# Patient Record
Sex: FEMALE | Race: WHITE | NOT HISPANIC OR LATINO
[De-identification: names, ages, dates, MRNs, and addresses within clinical notes are randomized per-mention and may not be internally consistent; named-entity substitution may affect disease eponyms.]

---

## 2017-01-31 ENCOUNTER — RX RENEWAL (OUTPATIENT)
Age: 72
End: 2017-01-31

## 2017-02-15 ENCOUNTER — RESULT REVIEW (OUTPATIENT)
Age: 72
End: 2017-02-15

## 2017-03-07 ENCOUNTER — RX RENEWAL (OUTPATIENT)
Age: 72
End: 2017-03-07

## 2017-04-05 ENCOUNTER — APPOINTMENT (OUTPATIENT)
Dept: HEMATOLOGY ONCOLOGY | Facility: CLINIC | Age: 72
End: 2017-04-05

## 2017-04-05 VITALS
BODY MASS INDEX: 18.25 KG/M2 | SYSTOLIC BLOOD PRESSURE: 110 MMHG | DIASTOLIC BLOOD PRESSURE: 70 MMHG | TEMPERATURE: 98 F | HEIGHT: 67 IN | WEIGHT: 116.25 LBS | RESPIRATION RATE: 14 BRPM | HEART RATE: 108 BPM | OXYGEN SATURATION: 98 %

## 2017-04-06 ENCOUNTER — RESULT REVIEW (OUTPATIENT)
Age: 72
End: 2017-04-06

## 2017-04-06 LAB
BASOPHILS # BLD AUTO: 0.03 K/UL
BASOPHILS NFR BLD AUTO: 0.4 %
EOSINOPHIL # BLD AUTO: 0.07 K/UL
EOSINOPHIL NFR BLD AUTO: 1 %
HCT VFR BLD CALC: 41.8 %
HGB BLD-MCNC: 12.9 G/DL
IMM GRANULOCYTES NFR BLD AUTO: 0.1 %
LYMPHOCYTES # BLD AUTO: 0.85 K/UL
LYMPHOCYTES NFR BLD AUTO: 12.5 %
MAN DIFF?: NORMAL
MCHC RBC-ENTMCNC: 27.6 PG
MCHC RBC-ENTMCNC: 30.9 GM/DL
MCV RBC AUTO: 89.3 FL
MONOCYTES # BLD AUTO: 0.32 K/UL
MONOCYTES NFR BLD AUTO: 4.7 %
NEUTROPHILS # BLD AUTO: 5.53 K/UL
NEUTROPHILS NFR BLD AUTO: 81.3 %
PLATELET # BLD AUTO: 151 K/UL
RBC # BLD: 4.68 M/UL
RBC # FLD: 14.1 %
WBC # FLD AUTO: 6.81 K/UL

## 2017-07-12 ENCOUNTER — APPOINTMENT (OUTPATIENT)
Dept: HEMATOLOGY ONCOLOGY | Facility: CLINIC | Age: 72
End: 2017-07-12

## 2017-07-12 VITALS
SYSTOLIC BLOOD PRESSURE: 120 MMHG | HEART RATE: 86 BPM | DIASTOLIC BLOOD PRESSURE: 70 MMHG | TEMPERATURE: 98.2 F | RESPIRATION RATE: 14 BRPM | WEIGHT: 114 LBS | OXYGEN SATURATION: 98 % | HEIGHT: 67 IN | BODY MASS INDEX: 17.89 KG/M2

## 2017-07-13 ENCOUNTER — LABORATORY RESULT (OUTPATIENT)
Age: 72
End: 2017-07-13

## 2017-07-13 ENCOUNTER — RESULT REVIEW (OUTPATIENT)
Age: 72
End: 2017-07-13

## 2017-07-13 LAB
BASOPHILS NFR BLD AUTO: 0.6 %
EOSINOPHIL NFR BLD AUTO: 1.1 %
HCT VFR BLD CALC: 41.4 %
HGB BLD-MCNC: 13.5 G/DL
LDH SERPL-CCNC: 257 U/L
LYMPHOCYTES NFR BLD AUTO: 14.7 %
MAN DIFF?: NO
MCHC RBC-ENTMCNC: 28 PG
MCHC RBC-ENTMCNC: 32.6 G/DL
MCV RBC AUTO: 85.9 FL
MONOCYTES NFR BLD AUTO: 6.8 %
NEUTROPHILS NFR BLD AUTO: 76.8 %
PLATELET # BLD AUTO: 92 K/UL
RBC # BLD: 4.82 M/UL
RBC # FLD: 14 %
WBC # FLD AUTO: 5.43 K/UL

## 2017-07-19 ENCOUNTER — LABORATORY RESULT (OUTPATIENT)
Age: 72
End: 2017-07-19

## 2017-07-19 ENCOUNTER — APPOINTMENT (OUTPATIENT)
Dept: HEMATOLOGY ONCOLOGY | Facility: CLINIC | Age: 72
End: 2017-07-19

## 2017-08-02 ENCOUNTER — LABORATORY RESULT (OUTPATIENT)
Age: 72
End: 2017-08-02

## 2017-08-02 ENCOUNTER — APPOINTMENT (OUTPATIENT)
Dept: HEMATOLOGY ONCOLOGY | Facility: CLINIC | Age: 72
End: 2017-08-02
Payer: MEDICARE

## 2017-08-02 PROCEDURE — 36415 COLL VENOUS BLD VENIPUNCTURE: CPT

## 2017-08-03 ENCOUNTER — LABORATORY RESULT (OUTPATIENT)
Age: 72
End: 2017-08-03

## 2017-08-31 ENCOUNTER — RESULT REVIEW (OUTPATIENT)
Age: 72
End: 2017-08-31

## 2017-09-06 ENCOUNTER — LABORATORY RESULT (OUTPATIENT)
Age: 72
End: 2017-09-06

## 2017-09-06 ENCOUNTER — APPOINTMENT (OUTPATIENT)
Dept: HEMATOLOGY ONCOLOGY | Facility: CLINIC | Age: 72
End: 2017-09-06
Payer: MEDICARE

## 2017-09-06 PROCEDURE — 36415 COLL VENOUS BLD VENIPUNCTURE: CPT

## 2017-09-07 ENCOUNTER — LABORATORY RESULT (OUTPATIENT)
Age: 72
End: 2017-09-07

## 2017-11-28 ENCOUNTER — APPOINTMENT (OUTPATIENT)
Dept: HEMATOLOGY ONCOLOGY | Facility: CLINIC | Age: 72
End: 2017-11-28
Payer: MEDICARE

## 2017-11-28 ENCOUNTER — INPATIENT (INPATIENT)
Facility: HOSPITAL | Age: 72
LOS: 1 days | Discharge: HOME | DRG: 813 | End: 2017-11-30
Attending: INTERNAL MEDICINE | Admitting: INTERNAL MEDICINE
Payer: MEDICARE

## 2017-11-28 VITALS
DIASTOLIC BLOOD PRESSURE: 76 MMHG | WEIGHT: 114 LBS | OXYGEN SATURATION: 98 % | SYSTOLIC BLOOD PRESSURE: 133 MMHG | HEART RATE: 86 BPM | TEMPERATURE: 97.5 F | RESPIRATION RATE: 14 BRPM | HEIGHT: 67 IN | BODY MASS INDEX: 17.89 KG/M2

## 2017-11-28 VITALS
DIASTOLIC BLOOD PRESSURE: 84 MMHG | RESPIRATION RATE: 18 BRPM | HEART RATE: 98 BPM | SYSTOLIC BLOOD PRESSURE: 128 MMHG | TEMPERATURE: 98 F

## 2017-11-28 DIAGNOSIS — Z90.710 ACQUIRED ABSENCE OF BOTH CERVIX AND UTERUS: Chronic | ICD-10-CM

## 2017-11-28 DIAGNOSIS — R63.8 OTHER SYMPTOMS AND SIGNS CONCERNING FOOD AND FLUID INTAKE: ICD-10-CM

## 2017-11-28 DIAGNOSIS — Z29.9 ENCOUNTER FOR PROPHYLACTIC MEASURES, UNSPECIFIED: ICD-10-CM

## 2017-11-28 DIAGNOSIS — C55 MALIGNANT NEOPLASM OF UTERUS, PART UNSPECIFIED: ICD-10-CM

## 2017-11-28 DIAGNOSIS — D69.3 IMMUNE THROMBOCYTOPENIC PURPURA: ICD-10-CM

## 2017-11-28 LAB
ANION GAP SERPL CALC-SCNC: 14 MMOL/L — SIGNIFICANT CHANGE UP (ref 5–17)
APTT BLD: 38.7 SEC — HIGH (ref 27.5–37.4)
BASOPHILS NFR BLD AUTO: 0.3 %
BASOPHILS NFR BLD AUTO: 0.4 % — SIGNIFICANT CHANGE UP (ref 0–2)
BLD GP AB SCN SERPL QL: NEGATIVE — SIGNIFICANT CHANGE UP
BUN SERPL-MCNC: 16 MG/DL — SIGNIFICANT CHANGE UP (ref 7–23)
CALCIUM SERPL-MCNC: 9.5 MG/DL — SIGNIFICANT CHANGE UP (ref 8.4–10.5)
CHLORIDE SERPL-SCNC: 103 MMOL/L — SIGNIFICANT CHANGE UP (ref 96–108)
CO2 SERPL-SCNC: 24 MMOL/L — SIGNIFICANT CHANGE UP (ref 22–31)
CREAT SERPL-MCNC: 0.71 MG/DL — SIGNIFICANT CHANGE UP (ref 0.5–1.3)
EOSINOPHIL NFR BLD AUTO: 0.9 %
EOSINOPHIL NFR BLD AUTO: 0.9 % — SIGNIFICANT CHANGE UP (ref 0–6)
GLUCOSE SERPL-MCNC: 106 MG/DL — HIGH (ref 70–99)
HCT VFR BLD CALC: 38.2 %
HCT VFR BLD CALC: 38.8 % — SIGNIFICANT CHANGE UP (ref 34.5–45)
HGB BLD-MCNC: 12.8 G/DL
HGB BLD-MCNC: 13 G/DL — SIGNIFICANT CHANGE UP (ref 11.5–15.5)
INR BLD: 1.01 — SIGNIFICANT CHANGE UP (ref 0.88–1.16)
LYMPHOCYTES # BLD AUTO: 10.8 % — LOW (ref 13–44)
LYMPHOCYTES NFR BLD AUTO: 13.2 %
MAN DIFF?: NO
MCHC RBC-ENTMCNC: 28.9 PG — SIGNIFICANT CHANGE UP (ref 27–34)
MCHC RBC-ENTMCNC: 29.2 PG
MCHC RBC-ENTMCNC: 33.5 G/DL
MCHC RBC-ENTMCNC: 33.5 G/DL — SIGNIFICANT CHANGE UP (ref 32–36)
MCV RBC AUTO: 86.2 FL — SIGNIFICANT CHANGE UP (ref 80–100)
MCV RBC AUTO: 87.2 FL
MONOCYTES NFR BLD AUTO: 5.6 %
MONOCYTES NFR BLD AUTO: 6.8 % — SIGNIFICANT CHANGE UP (ref 2–14)
NEUTROPHILS NFR BLD AUTO: 80 %
NEUTROPHILS NFR BLD AUTO: 81.1 % — HIGH (ref 43–77)
PLATELET # BLD AUTO: 2 K/UL
PLATELET # BLD AUTO: 2 K/UL — CRITICAL LOW (ref 150–400)
POTASSIUM SERPL-MCNC: 4 MMOL/L — SIGNIFICANT CHANGE UP (ref 3.5–5.3)
POTASSIUM SERPL-SCNC: 4 MMOL/L — SIGNIFICANT CHANGE UP (ref 3.5–5.3)
PROTHROM AB SERPL-ACNC: 11.2 SEC — SIGNIFICANT CHANGE UP (ref 9.8–12.7)
RBC # BLD: 4.38 M/UL
RBC # BLD: 4.5 M/UL — SIGNIFICANT CHANGE UP (ref 3.8–5.2)
RBC # FLD: 13.4 %
RBC # FLD: 13.4 % — SIGNIFICANT CHANGE UP (ref 10.3–16.9)
RH IG SCN BLD-IMP: POSITIVE — SIGNIFICANT CHANGE UP
RH IG SCN BLD-IMP: POSITIVE — SIGNIFICANT CHANGE UP
SODIUM SERPL-SCNC: 141 MMOL/L — SIGNIFICANT CHANGE UP (ref 135–145)
WBC # BLD: 7 K/UL — SIGNIFICANT CHANGE UP (ref 3.8–10.5)
WBC # FLD AUTO: 5.85 K/UL
WBC # FLD AUTO: 7 K/UL — SIGNIFICANT CHANGE UP (ref 3.8–10.5)

## 2017-11-28 PROCEDURE — 36415 COLL VENOUS BLD VENIPUNCTURE: CPT

## 2017-11-28 PROCEDURE — 99285 EMERGENCY DEPT VISIT HI MDM: CPT

## 2017-11-28 PROCEDURE — 99214 OFFICE O/P EST MOD 30 MIN: CPT | Mod: 25

## 2017-11-28 RX ORDER — DEXAMETHASONE 0.5 MG/5ML
40 ELIXIR ORAL ONCE
Qty: 0 | Refills: 0 | Status: COMPLETED | OUTPATIENT
Start: 2017-11-28 | End: 2017-11-28

## 2017-11-28 RX ORDER — IMMUNE GLOBULIN,GAMMA(IGG) 5 %
50 VIAL (ML) INTRAVENOUS ONCE
Qty: 0 | Refills: 0 | Status: COMPLETED | OUTPATIENT
Start: 2017-11-28 | End: 2017-11-28

## 2017-11-28 RX ADMIN — Medication 62.5 GRAM(S): at 22:05

## 2017-11-28 RX ADMIN — Medication 120 MILLIGRAM(S): at 21:07

## 2017-11-28 NOTE — H&P ADULT - HISTORY OF PRESENT ILLNESS
Patient is a 73 yo F w/ PMHx of ITP, previously on oral steroids and rituximab, follows with Dr. Caldwell, presenting today from her outpatient provider's office for a low platelet count. As per the patient, she regularly follows up with Dr. Caldwell for her ITP and gets routine CBCs regularly to ensure platelet levels remain stable. She had an appointment with Dr. Caldwell today, CBC showed a platelet count of 2, after which she was sent here to [facility] for further evaluation. The patient noticed some bruising on her skin along with bleeding gums and rectal spotting for the past week.   ED vitals: afebrile, HR-98, B.P-128/84, RR-18, o2 saturation 98% on RA. She was given dexamethasone and IVIG and admitted to [facility] for further management. Patient is a 73 yo F w/ PMHx of ITP diagnosed in 2014, s/p hystrectomy previously on oral steroids and rituximab, follows with Dr. Caldwell, presenting today from her outpatient provider's office for a low platelet count. As per the patient, she regularly follows up with Dr. Caldwell for her ITP and gets routine CBCs regularly to ensure platelet levels remain stable. She had an appointment with Dr. Caldwell today, CBC showed a platelet count of 2, after which she was sent here to [facility] for further evaluation. The patient noticed some bruising on her skin along with bleeding gums and rectal spotting for the past week.   ED vitals: afebrile, HR-98, B.P-128/84, RR-18, o2 saturation 98% on RA. She was given dexamethasone and IVIG and admitted to [facility] for further management. Patient is a 73 yo F w/ PMHx of ITP, uterine cancer s/p hysterectomy presenting today from her outpatient provider's office for a low platelet count. As per the patient, she was diagnosed with ITP in 2014, she follows with Dr. Caldwell regularly for treatment, she has received steroids and rituximab previously and currently was on no treatment. She noticed some bruising in her arms, chest, belly and legs which started almost 1 week ago associated with bleeding gums and mild rectal spotting which started 3 days ago. She went to see Dr. Caldwell today, who ordered a CBC which showed a platelet count of 2, for which she was told to come to [facility] for further treatment.   Of note, after reviewing [employer], patient had a CBC done in Sept which showed a platelet count of 98, CBC done in August showed a platelet count of 107.   ED vitals: afebrile, HR-98, B.P-128/84, RR-18, o2 saturation 98% on RA. She was given dexamethasone and IVIG and admitted to [facility] for further management.

## 2017-11-28 NOTE — H&P ADULT - ASSESSMENT
71 yo F w/ PMHx of ITP coming to [facility] from her outpatient provider's office for a low platelet count of 2, being admitted to [facility] for steroid and IVIG treatment.

## 2017-11-28 NOTE — H&P ADULT - NSHPSOCIALHISTORY_GEN_ALL_CORE
Tobacco use: Past smoker, quit almost 40 years ago.   Alcohol use: Denies excessive alcohol use, 1 drink on social occasions.  Denies drug abuse.

## 2017-11-28 NOTE — H&P ADULT - NSHPPHYSICALEXAM_GEN_ALL_CORE
.  VITAL SIGNS:  T(C): 36.4 (11-28-17 @ 22:12), Max: 36.7 (11-28-17 @ 19:32)  T(F): 97.6 (11-28-17 @ 22:12), Max: 98.1 (11-28-17 @ 19:32)  HR: 80 (11-28-17 @ 22:12) (80 - 98)  BP: 155/80 (11-28-17 @ 22:12) (128/84 - 155/80)  BP(mean): --  RR: 18 (11-28-17 @ 22:12) (18 - 18)  SpO2: 98% (11-28-17 @ 22:12) (98% - 98%)  Wt(kg): --    PHYSICAL EXAM:    Constitutional: WDWN resting comfortably in bed; NAD  Head: NC/AT  Eyes: PERRL, EOMI, anicteric sclera  ENT: no nasal discharge; uvula midline, no oropharyngeal erythema or exudates; MMM  Neck: supple; no JVD or thyromegaly  Respiratory: CTA B/L; no W/R/R, no retractions  Cardiac: +S1/S2; RRR; no M/R/G; PMI non-displaced  Gastrointestinal: abdomen soft, NT/ND; no rebound or guarding; +BSx4  Back: spine midline, no bony tenderness or step-offs; no CVAT B/L  Extremities: WWP, no clubbing or cyanosis; no peripheral edema  Musculoskeletal: NROM x4; no joint swelling, tenderness or erythema  Vascular: 2+ radial, femoral, DP/PT pulses B/L  Dermatologic: skin warm, dry and intact; no rashes, wounds, or scars  Lymphatic: no submandibular or cervical LAD  Neurologic: AAOx3; CNII-XII grossly intact; no focal deficits  Psychiatric: affect and characteristics of appearance, verbalizations, behaviors are appropriate VITAL SIGNS:  T(C): 36.4 (11-28-17 @ 22:12), Max: 36.7 (11-28-17 @ 19:32)  T(F): 97.6 (11-28-17 @ 22:12), Max: 98.1 (11-28-17 @ 19:32)  HR: 80 (11-28-17 @ 22:12) (80 - 98)  BP: 155/80 (11-28-17 @ 22:12) (128/84 - 155/80)  BP(mean): --  RR: 18 (11-28-17 @ 22:12) (18 - 18)  SpO2: 98% (11-28-17 @ 22:12) (98% - 98%)  Wt(kg): --    PHYSICAL EXAM:    Constitutional: WDWN resting comfortably in bed; NAD  Head: NC/AT  Eyes: PERRL, EOMI, anicteric sclera  ENT: no nasal discharge; uvula midline, no oropharyngeal erythema or exudates; MMM  Neck: supple; no JVD or thyromegaly  Respiratory: CTA B/L; no W/R/R, no retractions  Cardiac: +S1/S2; RRR; no M/R/G; PMI non-displaced  Gastrointestinal: abdomen soft, NT/ND; no rebound or guarding; +BSx4  Back: spine midline, no bony tenderness or step-offs; no CVAT B/L  Extremities: WWP, no clubbing or cyanosis; no peripheral edema  Musculoskeletal: NROM x4; no joint swelling, tenderness or erythema  Vascular: 2+ radial, femoral, DP/PT pulses B/L  Dermatologic: skin warm, dry and intact; no rashes, wounds, or scars  Lymphatic: no submandibular or cervical LAD  Neurologic: AAOx3; CNII-XII grossly intact; no focal deficits  Psychiatric: affect and characteristics of appearance, verbalizations, behaviors are appropriate VITAL SIGNS:  T(C): 36.4 (11-28-17 @ 22:12), Max: 36.7 (11-28-17 @ 19:32)  T(F): 97.6 (11-28-17 @ 22:12), Max: 98.1 (11-28-17 @ 19:32)  HR: 80 (11-28-17 @ 22:12) (80 - 98)  BP: 155/80 (11-28-17 @ 22:12) (128/84 - 155/80)  BP(mean): --  RR: 18 (11-28-17 @ 22:12) (18 - 18)  SpO2: 98% (11-28-17 @ 22:12) (98% - 98%)  Wt(kg): --    PHYSICAL EXAM:  Physical Exam:   Constitutional: NAD, well-groomed, well-developed  HEENT: PERRLA, EOMI, no drainage or redness  Neck: ,  No JVD  Back: Normal spine flexure, No CVA tenderness, No deformity or limitation of movement  Respiratory: Breath Sounds equal & clear to auscultation, no accessory muscle use  Cardiovascular: Regular rate & rhythm, normal S1, S2; no murmurs, gallops or rubs; no S3, S4  Gastrointestinal: Soft, non-tender, non distended, normal bowel sounds  Extremities: No peripheral edema, No cyanosis, clubbing   Vascular: Equal and normal pulses: 2+ peripheral pulses throughout  Neurological:    A&O x 3; no sensory, motor  deficits, normal reflexes  Psychiatric: Normal mood, normal affect  Skin: red petechiae over legs, chest, arms

## 2017-11-28 NOTE — H&P ADULT - PROBLEM SELECTOR PLAN 1
-History of ITP, follows with Dr. Caldwell for a platelet count of 2 on outpatient labs, most recent CBC in Sept showed a platelet count of 98. Patient previously has received 2 rounds of steroid treatment along with rituximab infusion, currently on no treatment.   -Admit to [facility] for further management.  -s/p -History of ITP, follows with Dr. Caldwell, sent it by her outpatient provider's office for a platelet count of 2 on outpatient labs, most recent CBC in Sept showed a platelet count of 98. Patient previously has received 2 rounds of steroid treatment along with rituximab infusion, currently on no treatment.   -Admit to [facility] for further management.  -Will give patient decadron and IVIG infusion  -Frequent neuro checks q1 as increase risk in intracranial hemorrhage -History of ITP, follows with Dr. Caldwell, sent it by her outpatient provider's office for a platelet count of 2 on outpatient labs, most recent CBC in Sept showed a platelet count of 98. Patient previously has received 2 rounds of steroid treatment along with rituximab infusion, currently on no treatment.   -Admit to [facility] for further management.  -Will give patient decadron and IVIG infusion  -Frequent neuro checks q1 as increase risk in intracranial hemorrhage  -Please follow heme/onc recs in the AM regarding further treatment. Patient follows with Dr. Caldwell

## 2017-11-28 NOTE — ED PROVIDER NOTE - OBJECTIVE STATEMENT
history of ITP here with low platelets.  Had f/u with [facility] doctor who did labs showing platelet count of 2.  Notes some bruising on arms, gingival bleeding.  Denies headache/chest pain/ sob/ fever/chills/ preceding illness.

## 2017-11-28 NOTE — H&P ADULT - PROBLEM SELECTOR PLAN 4
Dispo: [facility]  Code Status: ppx: IMPROVE score of 1 for age, no HSQ indicated, also will hold in the setting of thrombocytopenia. Patient ambulating, low risk of DVT.     Dispo: [facility]

## 2017-11-28 NOTE — ED CLERICAL - NS ED CLERK NOTE PRE-ARRIVAL INFORMATION; ADDITIONAL PRE-ARRIVAL INFORMATION
72/F/ YUSUF QUINTERO SENT IN BY DR. CAMILO ITP, [facility] 2 WKS TODAY PREDNISRE 40 MG IVIG TO BE ADM UNDER DR. CAMILO ( DR. STOKES)

## 2017-11-28 NOTE — ED PROVIDER NOTE - MEDICAL DECISION MAKING DETAILS
here with low platelets.  suspect recurrent itp.  discussed with Dr. Caldwell who requested 40mg of decadron IV, and ivig 1g/kg.  recommended against transfusion of platelets even though level < 10k due to underlying cause.  admit for further management

## 2017-11-28 NOTE — ED ADULT NURSE NOTE - OBJECTIVE STATEMENT
pt sent in by PMD for low platelet count. pt has hx of platelet disorder. c/o increased ecchymosis to body. denies any CP, SOB , abdominal pain. aaox3, resp easy & unlabored.

## 2017-11-28 NOTE — H&P ADULT - PROBLEM SELECTOR PLAN 3
Diet: Regular  Replete lytes PRN Diet: Regular  Patient tolerating PO intake, no need for fluids at this time  Replete lytes PRN

## 2017-11-28 NOTE — ED PROVIDER NOTE - PROGRESS NOTE DETAILS
attempted admission to [facility] but medicine team concerned about possible spontaneous bleed given platelets < 10k.  requested icu consult.  icu consulted, will see patient

## 2017-11-28 NOTE — H&P ADULT - NSHPLABSRESULTS_GEN_ALL_CORE
.  LABS:                         13.0   7.0   )-----------( 2        ( 28 Nov 2017 20:02 )             38.8     11-28    141  |  103  |  16  ----------------------------<  106<H>  4.0   |  24  |  0.71    Ca    9.5      28 Nov 2017 20:02      PT/INR - ( 28 Nov 2017 20:02 )   PT: 11.2 sec;   INR: 1.01          PTT - ( 28 Nov 2017 20:02 )  PTT:38.7 sec              RADIOLOGY, EKG & ADDITIONAL TESTS: Reviewed. LABS:                         13.0   7.0   )-----------( 2        ( 28 Nov 2017 20:02 )             38.8     11-28    141  |  103  |  16  ----------------------------<  106<H>  4.0   |  24  |  0.71    Ca    9.5      28 Nov 2017 20:02      PT/INR - ( 28 Nov 2017 20:02 )   PT: 11.2 sec;   INR: 1.01          PTT - ( 28 Nov 2017 20:02 )  PTT:38.7 sec              RADIOLOGY, EKG & ADDITIONAL TESTS: Reviewed.

## 2017-11-29 LAB
ANION GAP SERPL CALC-SCNC: 14 MMOL/L — SIGNIFICANT CHANGE UP (ref 5–17)
BUN SERPL-MCNC: 14 MG/DL — SIGNIFICANT CHANGE UP (ref 7–23)
CALCIUM SERPL-MCNC: 9.1 MG/DL — SIGNIFICANT CHANGE UP (ref 8.4–10.5)
CHLORIDE SERPL-SCNC: 103 MMOL/L — SIGNIFICANT CHANGE UP (ref 96–108)
CO2 SERPL-SCNC: 23 MMOL/L — SIGNIFICANT CHANGE UP (ref 22–31)
CREAT SERPL-MCNC: 0.62 MG/DL — SIGNIFICANT CHANGE UP (ref 0.5–1.3)
ERYTHROCYTE [SEDIMENTATION RATE] IN BLOOD BY WESTERGREN METHOD: 13 MM/HR
FERRITIN SERPL-MCNC: 81 NG/ML
GLUCOSE SERPL-MCNC: 171 MG/DL — HIGH (ref 70–99)
HAPTOGLOB SERPL-MCNC: 106 MG/DL
HCT VFR BLD CALC: 37.2 % — SIGNIFICANT CHANGE UP (ref 34.5–45)
HGB BLD-MCNC: 12.5 G/DL — SIGNIFICANT CHANGE UP (ref 11.5–15.5)
IRON SATN MFR SERPL: 19 %
IRON SERPL-MCNC: 51 UG/DL
LDH SERPL-CCNC: 268 U/L
MAGNESIUM SERPL-MCNC: 2.1 MG/DL — SIGNIFICANT CHANGE UP (ref 1.6–2.6)
MCHC RBC-ENTMCNC: 29 PG — SIGNIFICANT CHANGE UP (ref 27–34)
MCHC RBC-ENTMCNC: 33.6 G/DL — SIGNIFICANT CHANGE UP (ref 32–36)
MCV RBC AUTO: 86.3 FL — SIGNIFICANT CHANGE UP (ref 80–100)
PLATELET # BLD AUTO: 1 K/UL — CRITICAL LOW (ref 150–400)
POTASSIUM SERPL-MCNC: 3.9 MMOL/L — SIGNIFICANT CHANGE UP (ref 3.5–5.3)
POTASSIUM SERPL-SCNC: 3.9 MMOL/L — SIGNIFICANT CHANGE UP (ref 3.5–5.3)
RBC # BLD: 4.31 M/UL — SIGNIFICANT CHANGE UP (ref 3.8–5.2)
RBC # FLD: 13.3 % — SIGNIFICANT CHANGE UP (ref 10.3–16.9)
SODIUM SERPL-SCNC: 140 MMOL/L — SIGNIFICANT CHANGE UP (ref 135–145)
TIBC SERPL-MCNC: 263 UG/DL
UIBC SERPL-MCNC: 212 UG/DL
VIT B12 SERPL-MCNC: 1579 PG/ML
WBC # BLD: 4.7 K/UL — SIGNIFICANT CHANGE UP (ref 3.8–10.5)
WBC # FLD AUTO: 4.7 K/UL — SIGNIFICANT CHANGE UP (ref 3.8–10.5)

## 2017-11-29 PROCEDURE — 99222 1ST HOSP IP/OBS MODERATE 55: CPT

## 2017-11-29 PROCEDURE — 99231 SBSQ HOSP IP/OBS SF/LOW 25: CPT | Mod: GC

## 2017-11-29 RX ORDER — IMMUNE GLOBULIN,GAMMA(IGG) 5 %
50 VIAL (ML) INTRAVENOUS DAILY
Qty: 0 | Refills: 0 | Status: DISCONTINUED | OUTPATIENT
Start: 2017-11-29 | End: 2017-11-30

## 2017-11-29 RX ORDER — DEXAMETHASONE 0.5 MG/5ML
40 ELIXIR ORAL DAILY
Qty: 0 | Refills: 0 | Status: DISCONTINUED | OUTPATIENT
Start: 2017-11-29 | End: 2017-11-30

## 2017-11-29 RX ADMIN — Medication 120 MILLIGRAM(S): at 22:14

## 2017-11-29 RX ADMIN — Medication 62.5 GRAM(S): at 23:55

## 2017-11-29 NOTE — CONSULT NOTE ADULT - SUBJECTIVE AND OBJECTIVE BOX
HPI:  Patient is a 71 yo F w/ PMHx of ITP, uterine cancer s/p hysterectomy presenting today from her outpatient provider's office for a low platelet count. As per the patient, she was diagnosed with ITP in 2014, she follows with Dr. Caldwell regularly for treatment, she has received steroids and rituximab previously and currently was on no treatment. She noticed some bruising in her arms, chest, belly and legs which started almost 1 week ago associated with bleeding gums and mild rectal spotting which started 3 days ago. She went to see Dr. Caldwell today, who ordered a CBC which showed a platelet count of 2, for which she was told to come to [facility] for further treatment.   Of note, after reviewing [employer], patient had a CBC done in Sept which showed a platelet count of 98, CBC done in August showed a platelet count of 107.   ED vitals: afebrile, HR-98, B.P-128/84, RR-18, o2 saturation 98% on RA. She was given dexamethasone and IVIG and admitted to [facility] for further management. (28 Nov 2017 22:58)      PAST MEDICAL & SURGICAL HISTORY:  Idiopathic thrombocytopenia purpura  H/O abdominal hysterectomy       Review of Systems:  · General	negative  · Skin/Breast	negative  · Ophthalmologic	negative  · ENMT	negative  · Respiratory and Thorax	negative  · Cardiovascular	negative  · Gastrointestinal	negative  · Genitourinary	negative  · Musculoskeletal Comments	negative  · Neurological	negative      MEDICATIONS  (STANDING):    MEDICATIONS  (PRN):      Allergies    sulfa drugs (Unknown)    Intolerances        SOCIAL HISTORY:    FAMILY HISTORY:  Family history of lung cancer (Father)      Vital Signs Last 24 Hrs  T(C): 36.3 (29 Nov 2017 16:54), Max: 36.9 (29 Nov 2017 13:00)  T(F): 97.4 (29 Nov 2017 16:54), Max: 98.5 (29 Nov 2017 13:00)  HR: 78 (29 Nov 2017 12:33) (68 - 98)  BP: 137/74 (29 Nov 2017 12:33) (124/67 - 155/80)  BP(mean): 98 (29 Nov 2017 12:33) (98 - 98)  RR: 17 (29 Nov 2017 12:33) (16 - 18)  SpO2: 100% (29 Nov 2017 12:33) (96% - 100%)     Physical Exam:  · Constitutional	detailed exam  · Constitutional Details	well-developed; well-groomed  · Eyes	EOMI; PERRL; no drainage or redness  · ENMT Comments	dry mucous membranes  · Respiratory	detailed exam  · Respiratory Comments	normal breath sounds at the lung bases bilaterally  · Cardiovascular	Regular rate & rhythm, normal S1, S2; no murmurs, gallops or rubs; no S3, S4  · Abd-Soft non tender  ·Ext-no edema, clubbing or cyanosis      LABS:                        12.5   4.7   )-----------( 1        ( 29 Nov 2017 06:19 )             37.2     11-29    140  |  103  |  14  ----------------------------<  171<H>  3.9   |  23  |  0.62    Ca    9.1      29 Nov 2017 06:18  Mg     2.1     11-29      PT/INR - ( 28 Nov 2017 20:02 )   PT: 11.2 sec;   INR: 1.01          PTT - ( 28 Nov 2017 20:02 )  PTT:38.7 sec      RADIOLOGY & ADDITIONAL STUDIES:
71 yo F PMH ITP dx July 2014, hysterectomy 2/2 endometriosis presenting per hematologist, Dr. Caldwell, due to low plt for emergent IVIG and IV steroids.  Pt's labs were last checked in August 2017 and was told her plt was at an adequate level.  Had labs checked today and was called for low plt.  Reports bruising on her elbows, gum bleeding when brushing her teeth for the past few days, and new red spots on her legs, arms, and chest.  No vaginal bleeding or bleeding otherwise.  No HA, motor, or sensory loss.     Was previously on oral steroid courses 2x and had received rituximab infusions x 2, last in 2015.  Had BM bx for workup of ITP in past, no known cause for ITP currently.  VSS in ED.  Plt 2.  ICU consulted for frequent neuro check out of concern for spontaneous hemorrhage.     PAST MEDICAL & SURGICAL HISTORY:  Idiopathic thrombocytopenia purpura  H/O abdominal hysterectomy    FAMILY HISTORY:  Family history of lung cancer (Father)    MEDICATIONS:  vit B12  vit D3    Vital Signs Last 24 Hrs  T(C): 36.7 (29 Nov 2017 06:00), Max: 36.7 (28 Nov 2017 19:32)  T(F): 98.1 (29 Nov 2017 06:00), Max: 98.1 (28 Nov 2017 19:32)  HR: 79 (29 Nov 2017 06:00) (68 - 98)  BP: 124/67 (29 Nov 2017 06:00) (124/67 - 155/80)  BP(mean): --  RR: 16 (29 Nov 2017 06:00) (16 - 18)  SpO2: 99% (29 Nov 2017 06:00) (96% - 99%)    Physical Exam:   Constitutional: NAD, well-groomed, well-developed  HEENT: PERRLA, EOMI, no drainage or redness  Neck: ,  No JVD  Back: Normal spine flexure, No CVA tenderness, No deformity or limitation of movement  Respiratory: Breath Sounds equal & clear to auscultation, no accessory muscle use  Cardiovascular: Regular rate & rhythm, normal S1, S2; no murmurs, gallops or rubs; no S3, S4  Gastrointestinal: Soft, non-tender, non distended, normal bowel sounds  Extremities: No peripheral edema, No cyanosis, clubbing   Vascular: Equal and normal pulses: 2+ peripheral pulses throughout  Neurological:    A&O x 3; no sensory, motor  deficits, normal reflexes  Psychiatric: Normal mood, normal affect  Skin: red petechiae over legs, chest, arms    CBC Full  -  ( 28 Nov 2017 20:02 )  WBC Count : 7.0 K/uL  Hemoglobin : 13.0 g/dL  Hematocrit : 38.8 %  Platelet Count - Automated : 2 K/uL  Mean Cell Volume : 86.2 fL  Mean Cell Hemoglobin : 28.9 pg  Mean Cell Hemoglobin Concentration : 33.5 g/dL  Auto Neutrophil # : x  Auto Lymphocyte # : x  Auto Monocyte # : x  Auto Eosinophil # : x  Auto Basophil # : x  Auto Neutrophil % : 81.1 %  Auto Lymphocyte % : 10.8 %  Auto Monocyte % : 6.8 %  Auto Eosinophil % : 0.9 %  Auto Basophil % : 0.4 %    PT/INR - ( 28 Nov 2017 20:02 )   PT: 11.2 sec;   INR: 1.01          PTT - ( 28 Nov 2017 20:02 )  PTT:38.7 sec  11-28    141  |  103  |  16  ----------------------------<  106<H>  4.0   |  24  |  0.71    Ca    9.5      28 Nov 2017 20:02    71 yo F PMH ITP dx July 2014, hysterectomy 2/2 endometriosis presenting per hematologist, Dr. Caldwell, due to low plt for emergent IVIG and IV steroids.    -ivig and steroids ordered; continue per heme recs; avoid plt transfusion in setting of ITP  -frequent neuro checks    dispo:  [facility]

## 2017-11-29 NOTE — PROGRESS NOTE ADULT - SUBJECTIVE AND OBJECTIVE BOX
OVERNIGHT EVENTS:    SUBJECTIVE / INTERVAL HPI: Patient seen and examined at bedside.     VITAL SIGNS:  Vital Signs Last 24 Hrs  T(C): 36.4 (29 Nov 2017 09:09), Max: 36.7 (28 Nov 2017 19:32)  T(F): 97.5 (29 Nov 2017 09:09), Max: 98.1 (28 Nov 2017 19:32)  HR: 80 (29 Nov 2017 09:09) (68 - 98)  BP: 133/71 (29 Nov 2017 09:09) (124/67 - 155/80)  BP(mean): --  RR: 17 (29 Nov 2017 09:09) (16 - 18)  SpO2: 100% (29 Nov 2017 09:09) (96% - 100%)    PHYSICAL EXAM:    General: WDWN  HEENT: NC/AT; PERRL, anicteric sclera; MMM  Neck: supple  Cardiovascular: +S1/S2; RRR  Respiratory: CTA B/L; no W/R/R  Gastrointestinal: soft, NT/ND; +BSx4  Extremities: WWP; no edema, clubbing or cyanosis  Vascular: 2+ radial, DP/PT pulses B/L  Neurological: AAOx3; no focal deficits    MEDICATIONS:  MEDICATIONS  (STANDING):    MEDICATIONS  (PRN):      ALLERGIES:  Allergies    sulfa drugs (Unknown)    Intolerances        LABS:                        12.5   4.7   )-----------( 1        ( 29 Nov 2017 06:19 )             37.2     11-29    140  |  103  |  14  ----------------------------<  171<H>  3.9   |  23  |  0.62    Ca    9.1      29 Nov 2017 06:18  Mg     2.1     11-29      PT/INR - ( 28 Nov 2017 20:02 )   PT: 11.2 sec;   INR: 1.01          PTT - ( 28 Nov 2017 20:02 )  PTT:38.7 sec    CAPILLARY BLOOD GLUCOSE          RADIOLOGY & ADDITIONAL TESTS: Reviewed.    ASSESSMENT:    PLAN: OVERNIGHT EVENTS: JOSI    SUBJECTIVE / INTERVAL HPI: Patient seen and examined at bedside. Patient is laying comfortably in bed and is not reporting any acute complaints. Pt states petechiae over b/l extremities worsening over past few days. ROS negative.     VITAL SIGNS:  Vital Signs Last 24 Hrs  T(C): 36.4 (29 Nov 2017 09:09), Max: 36.7 (28 Nov 2017 19:32)  T(F): 97.5 (29 Nov 2017 09:09), Max: 98.1 (28 Nov 2017 19:32)  HR: 80 (29 Nov 2017 09:09) (68 - 98)  BP: 133/71 (29 Nov 2017 09:09) (124/67 - 155/80)  BP(mean): --  RR: 17 (29 Nov 2017 09:09) (16 - 18)  SpO2: 100% (29 Nov 2017 09:09) (96% - 100%)    PHYSICAL EXAM:    General: WDWN female resting comfortably in bed  HEENT: NC/AT; PERRL, anicteric sclera; MMM  Neck: supple  Cardiovascular: +S1/S2; RRR  Respiratory: CTA B/L; no W/R/R  Gastrointestinal: soft, NT/ND; +BSx4  Extremities: WWP; no edema, clubbing or cyanosis. Diffuse petechiae bilaterally.   Vascular: 2+ radial, DP/PT pulses B/L  Neurological: AAOx3; no focal deficits    MEDICATIONS:  MEDICATIONS  (STANDING):    MEDICATIONS  (PRN):      ALLERGIES:  Allergies    sulfa drugs (Unknown)    Intolerances        LABS:                        12.5   4.7   )-----------( 1        ( 29 Nov 2017 06:19 )             37.2     11-29    140  |  103  |  14  ----------------------------<  171<H>  3.9   |  23  |  0.62    Ca    9.1      29 Nov 2017 06:18  Mg     2.1     11-29      PT/INR - ( 28 Nov 2017 20:02 )   PT: 11.2 sec;   INR: 1.01          PTT - ( 28 Nov 2017 20:02 )  PTT:38.7 sec    CAPILLARY BLOOD GLUCOSE          RADIOLOGY & ADDITIONAL TESTS: Reviewed.    ASSESSMENT:    PLAN:

## 2017-11-29 NOTE — CONSULT NOTE ADULT - PROBLEM SELECTOR RECOMMENDATION 9
Patient to continue IV Decadron 40 mg x4 days and IVIG 50 g IV x2 daysPatient was evaluated by surgeon Dr. Mary Bourgeois for possible splenectomy

## 2017-11-29 NOTE — CONSULT NOTE ADULT - ATTENDING COMMENTS
history as per dr. vo. severe thrombocytopenia in patient with recurrent ITP. plan to admit to medical stepdown to monitor for lbleeding eposides/neuro change in view of marked thrombocytopenia. plan for treat ment with ivigG and steroids as per hematologist.

## 2017-11-29 NOTE — PATIENT PROFILE ADULT. - NS TRANSFER PATIENT BELONGINGS
Cell Phone/PDA (specify)/Electronic Device (specify)/Ipad, credit cards, bracelets and rings/Jewelry/Money (specify)/Clothing

## 2017-11-29 NOTE — PROGRESS NOTE ADULT - ASSESSMENT
73 yo F w/ PMHx of ITP coming to [facility] from her outpatient provider's office for a low platelet count of 2, being admitted to [facility] for steroid and IVIG treatment.

## 2017-11-30 ENCOUNTER — TRANSCRIPTION ENCOUNTER (OUTPATIENT)
Age: 72
End: 2017-11-30

## 2017-11-30 ENCOUNTER — RX RENEWAL (OUTPATIENT)
Age: 72
End: 2017-11-30

## 2017-11-30 VITALS
HEART RATE: 76 BPM | RESPIRATION RATE: 19 BRPM | DIASTOLIC BLOOD PRESSURE: 57 MMHG | OXYGEN SATURATION: 100 % | SYSTOLIC BLOOD PRESSURE: 122 MMHG

## 2017-11-30 LAB
ANION GAP SERPL CALC-SCNC: 13 MMOL/L — SIGNIFICANT CHANGE UP (ref 5–17)
BUN SERPL-MCNC: 16 MG/DL — SIGNIFICANT CHANGE UP (ref 7–23)
CALCIUM SERPL-MCNC: 8.9 MG/DL — SIGNIFICANT CHANGE UP (ref 8.4–10.5)
CHLORIDE SERPL-SCNC: 101 MMOL/L — SIGNIFICANT CHANGE UP (ref 96–108)
CO2 SERPL-SCNC: 23 MMOL/L — SIGNIFICANT CHANGE UP (ref 22–31)
CREAT SERPL-MCNC: 0.62 MG/DL — SIGNIFICANT CHANGE UP (ref 0.5–1.3)
GLUCOSE SERPL-MCNC: 136 MG/DL — HIGH (ref 70–99)
HCT VFR BLD CALC: 35.8 % — SIGNIFICANT CHANGE UP (ref 34.5–45)
HGB BLD-MCNC: 12 G/DL — SIGNIFICANT CHANGE UP (ref 11.5–15.5)
MCHC RBC-ENTMCNC: 28.8 PG — SIGNIFICANT CHANGE UP (ref 27–34)
MCHC RBC-ENTMCNC: 33.5 G/DL — SIGNIFICANT CHANGE UP (ref 32–36)
MCV RBC AUTO: 86.1 FL — SIGNIFICANT CHANGE UP (ref 80–100)
PLATELET # BLD AUTO: 29 K/UL — LOW (ref 150–400)
POTASSIUM SERPL-MCNC: 4.3 MMOL/L — SIGNIFICANT CHANGE UP (ref 3.5–5.3)
POTASSIUM SERPL-SCNC: 4.3 MMOL/L — SIGNIFICANT CHANGE UP (ref 3.5–5.3)
RBC # BLD: 4.16 M/UL — SIGNIFICANT CHANGE UP (ref 3.8–5.2)
RBC # FLD: 13.5 % — SIGNIFICANT CHANGE UP (ref 10.3–16.9)
SODIUM SERPL-SCNC: 137 MMOL/L — SIGNIFICANT CHANGE UP (ref 135–145)
WBC # BLD: 6.9 K/UL — SIGNIFICANT CHANGE UP (ref 3.8–10.5)
WBC # FLD AUTO: 6.9 K/UL — SIGNIFICANT CHANGE UP (ref 3.8–10.5)

## 2017-11-30 PROCEDURE — 86901 BLOOD TYPING SEROLOGIC RH(D): CPT

## 2017-11-30 PROCEDURE — 86900 BLOOD TYPING SEROLOGIC ABO: CPT

## 2017-11-30 PROCEDURE — 85730 THROMBOPLASTIN TIME PARTIAL: CPT

## 2017-11-30 PROCEDURE — 80048 BASIC METABOLIC PNL TOTAL CA: CPT

## 2017-11-30 PROCEDURE — 85610 PROTHROMBIN TIME: CPT

## 2017-11-30 PROCEDURE — 99285 EMERGENCY DEPT VISIT HI MDM: CPT

## 2017-11-30 PROCEDURE — 86850 RBC ANTIBODY SCREEN: CPT

## 2017-11-30 PROCEDURE — 85025 COMPLETE CBC W/AUTO DIFF WBC: CPT

## 2017-11-30 PROCEDURE — 83735 ASSAY OF MAGNESIUM: CPT

## 2017-11-30 PROCEDURE — 99232 SBSQ HOSP IP/OBS MODERATE 35: CPT

## 2017-11-30 PROCEDURE — 85027 COMPLETE CBC AUTOMATED: CPT

## 2017-11-30 PROCEDURE — 36415 COLL VENOUS BLD VENIPUNCTURE: CPT

## 2017-11-30 RX ORDER — DEXAMETHASONE 0.5 MG/5ML
40 ELIXIR ORAL ONCE
Qty: 0 | Refills: 0 | Status: COMPLETED | OUTPATIENT
Start: 2017-11-30 | End: 2017-11-30

## 2017-11-30 RX ADMIN — Medication 120 MILLIGRAM(S): at 10:57

## 2017-11-30 NOTE — DISCHARGE NOTE ADULT - PATIENT PORTAL LINK FT
“You can access the Follow[employer] Patient Portal, offered by [facility], by registering with the following website: [email or web address]”

## 2017-11-30 NOTE — DISCHARGE NOTE ADULT - CARE PROVIDER_API CALL
Marlene Caldwell), Internal Medicine  [address]  [address]  [place], [place] [address]  Phone: [phone]  Fax: [phone]

## 2017-11-30 NOTE — DISCHARGE NOTE ADULT - HOSPITAL COURSE
Patient is a 71 yo F w/ PMHx of ITP, uterine cancer s/p hysterectomy presenting today from her outpatient provider's office for a low platelet count. As per the patient, she was diagnosed with ITP in 2014. She was found to have a low platelet count as an outpatient and was instructed to come to [facility]. ED vitals: afebrile, HR-98, B.P-128/84, RR-18, o2 saturation 98% on RA. She was given dexamethasone and IVIG and admitted to [facility] for further management. She was asymptomatic. Given decadron 40 mg IV and IVIG. Received 2 doses of IVIG and 2 doses of decadron 40 with platelets rising to 29K. Per Dr. Caldwell, patient will receive one more dose of decadron today and take one more tomorrow at home. She is deemed stable for discharge.

## 2017-11-30 NOTE — PROGRESS NOTE ADULT - ASSESSMENT
71 yo F w/ PMHx of ITP coming to [facility] from her outpatient provider's office for a low platelet count of 2, being admitted to 7 [facility] for steroid and IVIG treatment.

## 2017-11-30 NOTE — PROGRESS NOTE ADULT - SUBJECTIVE AND OBJECTIVE BOX
OVERNIGHT EVENTS:    SUBJECTIVE / INTERVAL HPI: Patient seen and examined at bedside.     VITAL SIGNS:  Vital Signs Last 24 Hrs  T(C): 36.2 (30 Nov 2017 05:33), Max: 36.9 (29 Nov 2017 13:00)  T(F): 97.1 (30 Nov 2017 05:33), Max: 98.5 (29 Nov 2017 13:00)  HR: 91 (30 Nov 2017 05:10) (64 - 91)  BP: 109/63 (30 Nov 2017 05:10) (109/63 - 152/71)  BP(mean): 79 (30 Nov 2017 05:10) (79 - 102)  RR: 17 (30 Nov 2017 05:10) (17 - 19)  SpO2: 96% (30 Nov 2017 05:10) (96% - 100%)    PHYSICAL EXAM:    General: WDWN  HEENT: NC/AT; PERRL, anicteric sclera; MMM  Neck: supple  Cardiovascular: +S1/S2; RRR  Respiratory: CTA B/L; no W/R/R  Gastrointestinal: soft, NT/ND; +BSx4  Extremities: WWP; no edema, clubbing or cyanosis  Vascular: 2+ radial, DP/PT pulses B/L  Neurological: AAOx3; no focal deficits    MEDICATIONS:  MEDICATIONS  (STANDING):  dexamethasone  IVPB 40 milliGRAM(s) IV Intermittent daily  immune globulin gamma IVPB 50 Gram(s) IV Intermittent daily    MEDICATIONS  (PRN):      ALLERGIES:  Allergies    sulfa drugs (Unknown)    Intolerances        LABS:                        12.5   4.7   )-----------( 1        ( 29 Nov 2017 06:19 )             37.2     11-29    140  |  103  |  14  ----------------------------<  171<H>  3.9   |  23  |  0.62    Ca    9.1      29 Nov 2017 06:18  Mg     2.1     11-29      PT/INR - ( 28 Nov 2017 20:02 )   PT: 11.2 sec;   INR: 1.01          PTT - ( 28 Nov 2017 20:02 )  PTT:38.7 sec    CAPILLARY BLOOD GLUCOSE          RADIOLOGY & ADDITIONAL TESTS: Reviewed.    ASSESSMENT:    PLAN: OVERNIGHT EVENTS: JOSI    SUBJECTIVE / INTERVAL HPI: Patient seen and examined at bedside. Patient states she is feeling well. No reports of bleeding. ROS negative.     VITAL SIGNS:  Vital Signs Last 24 Hrs  T(C): 36.2 (30 Nov 2017 05:33), Max: 36.9 (29 Nov 2017 13:00)  T(F): 97.1 (30 Nov 2017 05:33), Max: 98.5 (29 Nov 2017 13:00)  HR: 91 (30 Nov 2017 05:10) (64 - 91)  BP: 109/63 (30 Nov 2017 05:10) (109/63 - 152/71)  BP(mean): 79 (30 Nov 2017 05:10) (79 - 102)  RR: 17 (30 Nov 2017 05:10) (17 - 19)  SpO2: 96% (30 Nov 2017 05:10) (96% - 100%)    PHYSICAL EXAM:    General: WDWN female resting comfortably in bed  HEENT: NC/AT; PERRL, anicteric sclera; MMM  Neck: supple  Cardiovascular: +S1/S2; RRR  Respiratory: CTA B/L; no W/R/R  Gastrointestinal: soft, NT/ND; +BSx4  Extremities: WWP; no edema, clubbing or cyanosis. Petechiae diffusely on bilateral lower extremities.   Vascular: 2+ radial, DP/PT pulses B/L  Neurological: AAOx3; no focal deficits    MEDICATIONS:  MEDICATIONS  (STANDING):  dexamethasone  IVPB 40 milliGRAM(s) IV Intermittent daily  immune globulin gamma IVPB 50 Gram(s) IV Intermittent daily    MEDICATIONS  (PRN):      ALLERGIES:  Allergies    sulfa drugs (Unknown)    Intolerances        LABS:                        12.5   4.7   )-----------( 1        ( 29 Nov 2017 06:19 )             37.2     11-29    140  |  103  |  14  ----------------------------<  171<H>  3.9   |  23  |  0.62    Ca    9.1      29 Nov 2017 06:18  Mg     2.1     11-29      PT/INR - ( 28 Nov 2017 20:02 )   PT: 11.2 sec;   INR: 1.01          PTT - ( 28 Nov 2017 20:02 )  PTT:38.7 sec    CAPILLARY BLOOD GLUCOSE          RADIOLOGY & ADDITIONAL TESTS: Reviewed.    ASSESSMENT:    PLAN:

## 2017-11-30 NOTE — PROGRESS NOTE ADULT - PROBLEM SELECTOR PLAN 3
Diet: Regular  Patient tolerating PO intake, no need for fluids at this time  Replete lytes PRN
Diet: Regular  Patient tolerating PO intake, no need for fluids at this time  Replete lytes PRN

## 2017-11-30 NOTE — PROGRESS NOTE ADULT - PROBLEM SELECTOR PLAN 4
ppx: IMPROVE score of 1 for age, no HSQ indicated, also will hold in the setting of thrombocytopenia. Patient ambulating, low risk of DVT.     Dispo: Discharge home
ppx: IMPROVE score of 1 for age, no HSQ indicated, also will hold in the setting of thrombocytopenia. Patient ambulating, low risk of DVT.     Dispo: [facility]

## 2017-11-30 NOTE — DISCHARGE NOTE ADULT - PLAN OF CARE
Please continue to eat healthy and follow up with your outpatient provider. 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 On this admission, you were found to have a very low platelet count. You were given decadron 40mg and IVIG to help bring up your platelets. At home, you will take one more dose of decadron 40mg tomorrow. Please follow up with Dr. Calwdell as an outpatient for further management. Instructions as above.

## 2017-11-30 NOTE — DISCHARGE NOTE ADULT - CARE PLAN
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 On this admission, you were found to have a very low platelet count. You were given decadron 40mg and IVIG to help bring up your platelets. At home, you will take one more dose of decadron 40mg tomorrow. Please follow up with Dr. Caldwell as an outpatient for further management.  Secondary Diagnosis:	Thrombocytopenia  Instructions for follow-up, activity and diet:	Instructions as above.  Secondary Diagnosis:	Nutrition, metabolism, and development symptoms  Instructions for follow-up, activity and diet:	Please continue to eat healthy and follow up with your outpatient provider.

## 2017-11-30 NOTE — PROGRESS NOTE ADULT - SUBJECTIVE AND OBJECTIVE BOX
HEALTH ISSUES - PROBLEM Dx:  Prophylactic measure: Prophylactic measure  Nutrition, metabolism, and development symptoms: Nutrition, metabolism, and development symptoms  Malignant neoplasm of uterus, unspecified site: Malignant neoplasm of uterus, unspecified site  Idiopathic thrombocytopenia purpura: Idiopathic thrombocytopenia purpura          CHEMOTHERAPY REGIMEN:        Day:                          Diet:  Protocol:                                    IVF:      MEDICATIONS  (STANDING):  immune globulin gamma IVPB 50 Gram(s) IV Intermittent daily    MEDICATIONS  (PRN):      Allergies    sulfa drugs (Unknown)    Intolerances        DVT Prophylaxis: [ ] YES [ ] NO      Antibiotics: [ ] YES [ ] NO    Pain Scale (1-10):       Location:    Vital Signs Last 24 Hrs  T(C): 36.7 (30 Nov 2017 14:08), Max: 36.9 (29 Nov 2017 22:19)  T(F): 98.1 (30 Nov 2017 14:08), Max: 98.4 (29 Nov 2017 22:19)  HR: 76 (30 Nov 2017 14:30) (64 - 91)  BP: 122/57 (30 Nov 2017 14:30) (109/63 - 155/65)  BP(mean): 82 (30 Nov 2017 14:30) (79 - 102)  RR: 19 (30 Nov 2017 14:30) (16 - 19)  SpO2: 100% (30 Nov 2017 14:30) (96% - 100%)    Drug Dosing Weight  Height (cm): 170.2 (28 Nov 2017 20:10)  Weight (kg): 51.71 (28 Nov 2017 20:05)  BMI (kg/m2): 17.9 (28 Nov 2017 20:10)  BSA (m2): 1.59 (28 Nov 2017 20:10)     Physical Exam:  · Constitutional	detailed exam  · Constitutional Details	well-developed; well-groomed  · Eyes	EOMI; PERRL; no drainage or redness  · ENMT Comments	dry mucous membranes  · Respiratory	detailed exam  · Respiratory Comments	normal breath sounds at the lung bases bilaterally  · Cardiovascular	Regular rate & rhythm, normal S1, S2; no murmurs, gallops or rubs; no S3, S4  · Abd-Soft non tender  ·Ext-no edema, clubbing or cyanosis    URINARY CATHETER: [ ] YES [ ] NO     LABS:  CBC Full  -  ( 30 Nov 2017 07:58 )  WBC Count : 6.9 K/uL  Hemoglobin : 12.0 g/dL  Hematocrit : 35.8 %  Platelet Count - Automated : 29 K/uL  Mean Cell Volume : 86.1 fL  Mean Cell Hemoglobin : 28.8 pg  Mean Cell Hemoglobin Concentration : 33.5 g/dL  Auto Neutrophil # : x  Auto Lymphocyte # : x  Auto Monocyte # : x  Auto Eosinophil # : x  Auto Basophil # : x  Auto Neutrophil % : x  Auto Lymphocyte % : x  Auto Monocyte % : x  Auto Eosinophil % : x  Auto Basophil % : x    11-30    137  |  101  |  16  ----------------------------<  136<H>  4.3   |  23  |  0.62    Ca    8.9      30 Nov 2017 07:58  Mg     2.1     11-29      PT/INR - ( 28 Nov 2017 20:02 )   PT: 11.2 sec;   INR: 1.01          PTT - ( 28 Nov 2017 20:02 )  PTT:38.7 sec      CULTURES:    RADIOLOGY & ADDITIONAL STUDIES:

## 2017-11-30 NOTE — PROGRESS NOTE ADULT - PROBLEM SELECTOR PLAN 1
plt today 26k, OK to DC pt after IV decadron today, with PO Decadron tomorrow, f/u in my office 12/6
-History of ITP, follows with Dr. Caldwell, sent it by her outpatient provider's office for a platelet count of 2 on outpatient labs, most recent CBC in Sept showed a platelet count of 98. Now 1.   -Admit to [facility] for further management.  -S/p decadron and IVIG infusion  -Frequent neuro checks q2 as increase risk in intracranial hemorrhage  -Per heme/onc will likely need splenectomy
-History of ITP, follows with Dr. Caldwell, sent it by her outpatient provider's office for a platelet count of 2 on outpatient labs, most recent CBC in Sept showed a platelet count of 98. Now 1.   -Admit to [facility] for further management.  -S/p decadron and IVIG infusion, will get 1 more dose of decadron 40mg today and will go home with instructions to take 1 dose of decadron 40mg tomorrow.   -Per heme/onc will likely need splenectomy

## 2017-11-30 NOTE — PROGRESS NOTE ADULT - PROBLEM SELECTOR PLAN 2
-History of uterine cancer in the past s/p hysterectomy. No active issues at this time.
-History of uterine cancer in the past s/p hysterectomy. No active issues at this time.

## 2017-11-30 NOTE — PROGRESS NOTE ADULT - PROBLEM SELECTOR PROBLEM 1
Idiopathic thrombocytopenia purpura

## 2017-12-03 DIAGNOSIS — Z85.42 PERSONAL HISTORY OF MALIGNANT NEOPLASM OF OTHER PARTS OF UTERUS: ICD-10-CM

## 2017-12-03 DIAGNOSIS — D69.6 THROMBOCYTOPENIA, UNSPECIFIED: ICD-10-CM

## 2017-12-03 DIAGNOSIS — D69.3 IMMUNE THROMBOCYTOPENIC PURPURA: ICD-10-CM

## 2017-12-06 ENCOUNTER — APPOINTMENT (OUTPATIENT)
Dept: HEMATOLOGY ONCOLOGY | Facility: CLINIC | Age: 72
End: 2017-12-06
Payer: MEDICARE

## 2017-12-06 VITALS
SYSTOLIC BLOOD PRESSURE: 149 MMHG | WEIGHT: 113 LBS | HEART RATE: 88 BPM | BODY MASS INDEX: 17.74 KG/M2 | OXYGEN SATURATION: 97 % | DIASTOLIC BLOOD PRESSURE: 82 MMHG | RESPIRATION RATE: 14 BRPM | TEMPERATURE: 98.2 F | HEIGHT: 67 IN

## 2017-12-06 PROCEDURE — 90662 IIV NO PRSV INCREASED AG IM: CPT

## 2017-12-06 PROCEDURE — 90734 MENACWYD/MENACWYCRM VACC IM: CPT | Mod: GY

## 2017-12-06 PROCEDURE — 90471 IMMUNIZATION ADMIN: CPT

## 2017-12-06 PROCEDURE — G0008: CPT | Mod: 59

## 2017-12-06 PROCEDURE — 99215 OFFICE O/P EST HI 40 MIN: CPT | Mod: 25

## 2017-12-07 ENCOUNTER — APPOINTMENT (OUTPATIENT)
Dept: HEMATOLOGY ONCOLOGY | Facility: CLINIC | Age: 72
End: 2017-12-07
Payer: MEDICARE

## 2017-12-07 ENCOUNTER — LABORATORY RESULT (OUTPATIENT)
Age: 72
End: 2017-12-07

## 2017-12-07 PROCEDURE — 36415 COLL VENOUS BLD VENIPUNCTURE: CPT

## 2017-12-08 ENCOUNTER — LABORATORY RESULT (OUTPATIENT)
Age: 72
End: 2017-12-08

## 2017-12-10 ENCOUNTER — FORM ENCOUNTER (OUTPATIENT)
Age: 72
End: 2017-12-10

## 2017-12-11 ENCOUNTER — OUTPATIENT (OUTPATIENT)
Dept: OUTPATIENT SERVICES | Facility: HOSPITAL | Age: 72
LOS: 1 days | End: 2017-12-11
Payer: MEDICARE

## 2017-12-11 DIAGNOSIS — Z90.710 ACQUIRED ABSENCE OF BOTH CERVIX AND UTERUS: Chronic | ICD-10-CM

## 2017-12-11 PROCEDURE — 74160 CT ABDOMEN W/CONTRAST: CPT | Mod: 26

## 2017-12-11 PROCEDURE — 74160 CT ABDOMEN W/CONTRAST: CPT

## 2017-12-14 ENCOUNTER — RX RENEWAL (OUTPATIENT)
Age: 72
End: 2017-12-14

## 2017-12-14 LAB
BASOPHILS NFR BLD AUTO: 0.6 %
EOSINOPHIL NFR BLD AUTO: 0.6 %
HCT VFR BLD CALC: 39.4 %
HGB BLD-MCNC: 12.9 G/DL
LYMPHOCYTES NFR BLD AUTO: 11.5 %
MAN DIFF?: NO
MCHC RBC-ENTMCNC: 28.6 PG
MCHC RBC-ENTMCNC: 32.7 G/DL
MCV RBC AUTO: 87.4 FL
MONOCYTES NFR BLD AUTO: 4.5 %
NEUTROPHILS NFR BLD AUTO: 82.8 %
PLATELET # BLD AUTO: 11 K/UL
RBC # BLD: 4.51 M/UL
RBC # FLD: 13 %
WBC # FLD AUTO: 7.1 K/UL

## 2017-12-14 RX ORDER — DEXAMETHASONE 4 MG/1
4 TABLET ORAL
Qty: 40 | Refills: 2 | Status: ACTIVE | COMMUNITY
Start: 2017-01-31 | End: 1900-01-01

## 2017-12-16 ENCOUNTER — RESULT REVIEW (OUTPATIENT)
Age: 72
End: 2017-12-16

## 2017-12-19 ENCOUNTER — OUTPATIENT (OUTPATIENT)
Dept: OUTPATIENT SERVICES | Facility: HOSPITAL | Age: 72
LOS: 1 days | End: 2017-12-19

## 2017-12-19 ENCOUNTER — NON-APPOINTMENT (OUTPATIENT)
Age: 72
End: 2017-12-19

## 2017-12-19 ENCOUNTER — OUTPATIENT (OUTPATIENT)
Dept: OUTPATIENT SERVICES | Facility: HOSPITAL | Age: 72
LOS: 1 days | End: 2017-12-19
Payer: MEDICARE

## 2017-12-19 ENCOUNTER — APPOINTMENT (OUTPATIENT)
Dept: HEMATOLOGY ONCOLOGY | Facility: CLINIC | Age: 72
End: 2017-12-19
Payer: MEDICARE

## 2017-12-19 VITALS
HEIGHT: 67 IN | TEMPERATURE: 97.6 F | HEART RATE: 100 BPM | SYSTOLIC BLOOD PRESSURE: 125 MMHG | OXYGEN SATURATION: 99 % | WEIGHT: 112 LBS | DIASTOLIC BLOOD PRESSURE: 74 MMHG | BODY MASS INDEX: 17.58 KG/M2 | RESPIRATION RATE: 14 BRPM

## 2017-12-19 DIAGNOSIS — Z90.710 ACQUIRED ABSENCE OF BOTH CERVIX AND UTERUS: Chronic | ICD-10-CM

## 2017-12-19 DIAGNOSIS — D69.3 IMMUNE THROMBOCYTOPENIC PURPURA: ICD-10-CM

## 2017-12-19 DIAGNOSIS — Z01.818 ENCOUNTER FOR OTHER PREPROCEDURAL EXAMINATION: ICD-10-CM

## 2017-12-19 PROCEDURE — 93000 ELECTROCARDIOGRAM COMPLETE: CPT

## 2017-12-19 PROCEDURE — 99214 OFFICE O/P EST MOD 30 MIN: CPT | Mod: 25

## 2017-12-19 PROCEDURE — 36415 COLL VENOUS BLD VENIPUNCTURE: CPT

## 2017-12-19 PROCEDURE — 71020: CPT | Mod: 26

## 2017-12-19 PROCEDURE — 71046 X-RAY EXAM CHEST 2 VIEWS: CPT

## 2017-12-19 RX ORDER — ROMIPLOSTIM 250 UG/.5ML
50 INJECTION, POWDER, LYOPHILIZED, FOR SOLUTION SUBCUTANEOUS ONCE
Qty: 0 | Refills: 0 | Status: DISCONTINUED | OUTPATIENT
Start: 2017-12-19 | End: 2018-01-03

## 2017-12-21 LAB
ALBUMIN SERPL ELPH-MCNC: 4 G/DL
ALP BLD-CCNC: 64 U/L
ALT SERPL-CCNC: 24 U/L
ANION GAP SERPL CALC-SCNC: 14 MMOL/L
APTT BLD: 32.8 SEC
AST SERPL-CCNC: 17 U/L
BASOPHILS # BLD AUTO: 0.01 K/UL
BASOPHILS NFR BLD AUTO: 0.1 %
BILIRUB SERPL-MCNC: 0.2 MG/DL
BUN SERPL-MCNC: 24 MG/DL
CALCIUM SERPL-MCNC: 9 MG/DL
CHLORIDE SERPL-SCNC: 100 MMOL/L
CO2 SERPL-SCNC: 28 MMOL/L
CREAT SERPL-MCNC: 0.81 MG/DL
EOSINOPHIL # BLD AUTO: 0.03 K/UL
EOSINOPHIL NFR BLD AUTO: 0.3 %
GLUCOSE SERPL-MCNC: 111 MG/DL
HCT VFR BLD CALC: 41.4 %
HGB BLD-MCNC: 13.1 G/DL
IMM GRANULOCYTES NFR BLD AUTO: 0.1 %
INR PPP: 0.94 RATIO
LYMPHOCYTES # BLD AUTO: 1.42 K/UL
LYMPHOCYTES NFR BLD AUTO: 16.2 %
MAN DIFF?: NORMAL
MCHC RBC-ENTMCNC: 28.6 PG
MCHC RBC-ENTMCNC: 31.6 GM/DL
MCV RBC AUTO: 90.4 FL
MONOCYTES # BLD AUTO: 0.28 K/UL
MONOCYTES NFR BLD AUTO: 3.2 %
NEUTROPHILS # BLD AUTO: 7 K/UL
NEUTROPHILS NFR BLD AUTO: 80.1 %
PLATELET # BLD AUTO: 160 K/UL
POTASSIUM SERPL-SCNC: 4.1 MMOL/L
PROT SERPL-MCNC: 7 G/DL
PT BLD: 10.6 SEC
RBC # BLD: 4.58 M/UL
RBC # FLD: 13.5 %
SODIUM SERPL-SCNC: 142 MMOL/L
WBC # FLD AUTO: 8.75 K/UL

## 2017-12-26 ENCOUNTER — APPOINTMENT (OUTPATIENT)
Dept: HEMATOLOGY ONCOLOGY | Facility: CLINIC | Age: 72
End: 2017-12-26
Payer: MEDICARE

## 2017-12-26 ENCOUNTER — OTHER (OUTPATIENT)
Age: 72
End: 2017-12-26

## 2017-12-26 VITALS
DIASTOLIC BLOOD PRESSURE: 71 MMHG | TEMPERATURE: 98 F | RESPIRATION RATE: 20 BRPM | WEIGHT: 111.99 LBS | HEART RATE: 81 BPM | SYSTOLIC BLOOD PRESSURE: 163 MMHG | HEIGHT: 67 IN | OXYGEN SATURATION: 100 %

## 2017-12-26 LAB
BASOPHILS NFR BLD AUTO: 0.4 %
EOSINOPHIL NFR BLD AUTO: 1 %
HCT VFR BLD CALC: 39.9 %
HGB BLD-MCNC: 13.2 G/DL
LYMPHOCYTES NFR BLD AUTO: 11 %
MAN DIFF?: NO
MCHC RBC-ENTMCNC: 29.1 PG
MCHC RBC-ENTMCNC: 33.1 G/DL
MCV RBC AUTO: 87.9 FL
MONOCYTES NFR BLD AUTO: 7.2 %
NEUTROPHILS NFR BLD AUTO: 80.4 %
PLATELET # BLD AUTO: 106 K/UL
RBC # BLD: 4.54 M/UL
RBC # FLD: 13.4 %
WBC # FLD AUTO: 6.8 K/UL

## 2017-12-26 PROCEDURE — 36415 COLL VENOUS BLD VENIPUNCTURE: CPT

## 2017-12-27 ENCOUNTER — RESULT REVIEW (OUTPATIENT)
Age: 72
End: 2017-12-27

## 2017-12-27 ENCOUNTER — INPATIENT (INPATIENT)
Facility: HOSPITAL | Age: 72
LOS: 1 days | Discharge: HOME HEALTH | DRG: 801 | End: 2017-12-29
Attending: SURGERY | Admitting: SURGERY
Payer: MEDICARE

## 2017-12-27 DIAGNOSIS — Z90.710 ACQUIRED ABSENCE OF BOTH CERVIX AND UTERUS: Chronic | ICD-10-CM

## 2017-12-27 RX ORDER — HYDROMORPHONE HYDROCHLORIDE 2 MG/ML
0.5 INJECTION INTRAMUSCULAR; INTRAVENOUS; SUBCUTANEOUS EVERY 4 HOURS
Qty: 0 | Refills: 0 | Status: DISCONTINUED | OUTPATIENT
Start: 2017-12-27 | End: 2017-12-29

## 2017-12-27 RX ORDER — ONDANSETRON 8 MG/1
4 TABLET, FILM COATED ORAL EVERY 6 HOURS
Qty: 0 | Refills: 0 | Status: DISCONTINUED | OUTPATIENT
Start: 2017-12-27 | End: 2017-12-29

## 2017-12-27 RX ORDER — OXYCODONE AND ACETAMINOPHEN 5; 325 MG/1; MG/1
1 TABLET ORAL EVERY 4 HOURS
Qty: 0 | Refills: 0 | Status: DISCONTINUED | OUTPATIENT
Start: 2017-12-27 | End: 2017-12-29

## 2017-12-27 RX ORDER — HEPARIN SODIUM 5000 [USP'U]/ML
5000 INJECTION INTRAVENOUS; SUBCUTANEOUS EVERY 8 HOURS
Qty: 0 | Refills: 0 | Status: DISCONTINUED | OUTPATIENT
Start: 2017-12-27 | End: 2017-12-29

## 2017-12-27 RX ORDER — ACETAMINOPHEN 500 MG
1000 TABLET ORAL ONCE
Qty: 0 | Refills: 0 | Status: COMPLETED | OUTPATIENT
Start: 2017-12-27 | End: 2017-12-28

## 2017-12-27 RX ORDER — SODIUM CHLORIDE 9 MG/ML
1000 INJECTION, SOLUTION INTRAVENOUS
Qty: 0 | Refills: 0 | Status: DISCONTINUED | OUTPATIENT
Start: 2017-12-27 | End: 2017-12-29

## 2017-12-27 RX ORDER — OXYCODONE AND ACETAMINOPHEN 5; 325 MG/1; MG/1
2 TABLET ORAL EVERY 4 HOURS
Qty: 0 | Refills: 0 | Status: DISCONTINUED | OUTPATIENT
Start: 2017-12-27 | End: 2017-12-29

## 2017-12-27 RX ORDER — BUPIVACAINE 13.3 MG/ML
20 INJECTION, SUSPENSION, LIPOSOMAL INFILTRATION ONCE
Qty: 0 | Refills: 0 | Status: DISCONTINUED | OUTPATIENT
Start: 2017-12-27 | End: 2017-12-29

## 2017-12-27 RX ADMIN — HEPARIN SODIUM 5000 UNIT(S): 5000 INJECTION INTRAVENOUS; SUBCUTANEOUS at 21:50

## 2017-12-27 RX ADMIN — ONDANSETRON 4 MILLIGRAM(S): 8 TABLET, FILM COATED ORAL at 19:48

## 2017-12-27 RX ADMIN — HYDROMORPHONE HYDROCHLORIDE 0.5 MILLIGRAM(S): 2 INJECTION INTRAMUSCULAR; INTRAVENOUS; SUBCUTANEOUS at 13:01

## 2017-12-27 RX ADMIN — HYDROMORPHONE HYDROCHLORIDE 0.5 MILLIGRAM(S): 2 INJECTION INTRAMUSCULAR; INTRAVENOUS; SUBCUTANEOUS at 13:27

## 2017-12-27 NOTE — H&P PST ADULT - ASSESSMENT
72F with ITP refractory to medical management presents for an elective robot assisted splenectomy  -To OR for splenectomy  -Postop admission for monitoring and pain control

## 2017-12-27 NOTE — PROGRESS NOTE ADULT - ASSESSMENT
72y Female s/p above procedure    Assess hx of steroids  Pain/nausea control PRN  Home meds  Incentive spirometer/OOB/Ambulate  IVF, Diet: CLD  AM labs  ToV: 7 PM

## 2017-12-27 NOTE — H&P PST ADULT - HISTORY OF PRESENT ILLNESS
72F with h/o chronic ITP previously treated with steroids and one round of IV IG. Patient not responsive to medical therapy and thus presented for an elective robot assisted splenectomy.

## 2017-12-27 NOTE — BRIEF OPERATIVE NOTE - PROCEDURE
<<-----Click on this checkbox to enter Procedure Robot-assisted splenectomy  12/27/2017    Active  [email or web address]

## 2017-12-27 NOTE — PROGRESS NOTE ADULT - SUBJECTIVE AND OBJECTIVE BOX
Team 4 Surgery Post-Op Note, PCN:     Pre-Op Dx: ITP  Procedure: Robot-assisted splenectomy    Surgeon: Mary Bourgeois    Subjective: Discussion regarding last dose of steroids -- patient drowsy, comfortable    Vital Signs Last 24 Hrs  T(C): 36.5 (27 Dec 2017 13:39), Max: 36.5 (27 Dec 2017 13:39)  T(F): 97.7 (27 Dec 2017 13:39), Max: 97.7 (27 Dec 2017 13:39)  HR: 86 (27 Dec 2017 14:30) (84 - 96)  BP: 128/77 (27 Dec 2017 14:30) (119/59 - 147/65)  BP(mean): 79 (27 Dec 2017 14:09) (79 - 102)  RR: 17 (27 Dec 2017 14:30) (6 - 20)  SpO2: 98% (27 Dec 2017 14:30) (97% - 100%)    Physical Exam:  General: NAD, resting comfortably in bed  Pulmonary: Nonlabored breathing, no respiratory distress  Cardiovascular: NSR  Abdominal: soft, NT/ND, obese  Extremities: WWP, normal strength  Neuro: A/O x 3, CNs II-XII grossly intact, no focal deficits, normal motor/sensation  Pulses: palpable distal pulses      LABS:            CAPILLARY BLOOD GLUCOSE            ABO Interpretation: O (12-27 @ 07:52)        Radiology and Additional Studies:

## 2017-12-27 NOTE — BRIEF OPERATIVE NOTE - OPERATION/FINDINGS
Slightly enlarged spleen  Splenic artery ligated with hemolock clips  Splenic hilum ligated with laparoscopic 60mm endo CLARISSE shaffer load x 2  19 Maori odalys drain left in LUQ

## 2017-12-28 DIAGNOSIS — D68.2 HEREDITARY DEFICIENCY OF OTHER CLOTTING FACTORS: ICD-10-CM

## 2017-12-28 DIAGNOSIS — D69.3 IMMUNE THROMBOCYTOPENIC PURPURA: ICD-10-CM

## 2017-12-28 LAB
ANION GAP SERPL CALC-SCNC: 12 MMOL/L — SIGNIFICANT CHANGE UP (ref 5–17)
BUN SERPL-MCNC: 8 MG/DL — SIGNIFICANT CHANGE UP (ref 7–23)
CALCIUM SERPL-MCNC: 9.5 MG/DL — SIGNIFICANT CHANGE UP (ref 8.4–10.5)
CHLORIDE SERPL-SCNC: 104 MMOL/L — SIGNIFICANT CHANGE UP (ref 96–108)
CO2 SERPL-SCNC: 26 MMOL/L — SIGNIFICANT CHANGE UP (ref 22–31)
CREAT SERPL-MCNC: 0.69 MG/DL — SIGNIFICANT CHANGE UP (ref 0.5–1.3)
GLUCOSE SERPL-MCNC: 118 MG/DL — HIGH (ref 70–99)
HCT VFR BLD CALC: 37.7 % — SIGNIFICANT CHANGE UP (ref 34.5–45)
HGB BLD-MCNC: 12.4 G/DL — SIGNIFICANT CHANGE UP (ref 11.5–15.5)
MAGNESIUM SERPL-MCNC: 2.1 MG/DL — SIGNIFICANT CHANGE UP (ref 1.6–2.6)
MCHC RBC-ENTMCNC: 29 PG — SIGNIFICANT CHANGE UP (ref 27–34)
MCHC RBC-ENTMCNC: 32.9 G/DL — SIGNIFICANT CHANGE UP (ref 32–36)
MCV RBC AUTO: 88.3 FL — SIGNIFICANT CHANGE UP (ref 80–100)
PHOSPHATE SERPL-MCNC: 2.9 MG/DL — SIGNIFICANT CHANGE UP (ref 2.5–4.5)
PLATELET # BLD AUTO: 173 K/UL — SIGNIFICANT CHANGE UP (ref 150–400)
POTASSIUM SERPL-MCNC: 4.3 MMOL/L — SIGNIFICANT CHANGE UP (ref 3.5–5.3)
POTASSIUM SERPL-SCNC: 4.3 MMOL/L — SIGNIFICANT CHANGE UP (ref 3.5–5.3)
RBC # BLD: 4.27 M/UL — SIGNIFICANT CHANGE UP (ref 3.8–5.2)
RBC # FLD: 13.5 % — SIGNIFICANT CHANGE UP (ref 10.3–16.9)
SODIUM SERPL-SCNC: 142 MMOL/L — SIGNIFICANT CHANGE UP (ref 135–145)
SURGICAL PATHOLOGY STUDY: SIGNIFICANT CHANGE UP
WBC # BLD: 27.3 K/UL — HIGH (ref 3.8–10.5)
WBC # FLD AUTO: 27.3 K/UL — HIGH (ref 3.8–10.5)

## 2017-12-28 PROCEDURE — 99233 SBSQ HOSP IP/OBS HIGH 50: CPT

## 2017-12-28 RX ORDER — ACETAMINOPHEN 500 MG
1000 TABLET ORAL ONCE
Qty: 0 | Refills: 0 | Status: COMPLETED | OUTPATIENT
Start: 2017-12-28 | End: 2017-12-28

## 2017-12-28 RX ADMIN — Medication 400 MILLIGRAM(S): at 10:50

## 2017-12-28 RX ADMIN — Medication 1000 MILLIGRAM(S): at 00:15

## 2017-12-28 RX ADMIN — SODIUM CHLORIDE 100 MILLILITER(S): 9 INJECTION, SOLUTION INTRAVENOUS at 00:05

## 2017-12-28 RX ADMIN — Medication 400 MILLIGRAM(S): at 00:04

## 2017-12-28 RX ADMIN — HEPARIN SODIUM 5000 UNIT(S): 5000 INJECTION INTRAVENOUS; SUBCUTANEOUS at 14:39

## 2017-12-28 RX ADMIN — Medication 400 MILLIGRAM(S): at 19:41

## 2017-12-28 RX ADMIN — HEPARIN SODIUM 5000 UNIT(S): 5000 INJECTION INTRAVENOUS; SUBCUTANEOUS at 05:02

## 2017-12-28 RX ADMIN — HEPARIN SODIUM 5000 UNIT(S): 5000 INJECTION INTRAVENOUS; SUBCUTANEOUS at 22:07

## 2017-12-28 NOTE — PROGRESS NOTE ADULT - SUBJECTIVE AND OBJECTIVE BOX
HEALTH ISSUES - PROBLEM Dx:          CHEMOTHERAPY REGIMEN:        Day:                          Diet:  Protocol:                                    IVF:      MEDICATIONS  (STANDING):  BUpivacaine liposome 1.3% Injectable (no eMAR) 20 milliLiter(s) Local Injection Once  heparin  Injectable 5000 Unit(s) SubCutaneous every 8 hours  lactated ringers. 1000 milliLiter(s) (100 mL/Hr) IV Continuous <Continuous>    MEDICATIONS  (PRN):  HYDROmorphone  Injectable 0.5 milliGRAM(s) IV Push every 4 hours PRN Breakthrough pain  ondansetron Injectable 4 milliGRAM(s) IV Push every 6 hours PRN Nausea and/or Vomiting  oxyCODONE    5 mG/acetaminophen 325 mG 1 Tablet(s) Oral every 4 hours PRN Moderate Pain (4 - 6)  oxyCODONE    5 mG/acetaminophen 325 mG 2 Tablet(s) Oral every 4 hours PRN Severe Pain (7 - 10)      Allergies    sulfa drugs (Unknown)    Intolerances        DVT Prophylaxis: [ ] YES [ ] NO      Antibiotics: [ ] YES [ ] NO    Pain Scale (1-10):       Location:    Vital Signs Last 24 Hrs  T(C): 36.8 (28 Dec 2017 17:16), Max: 37.1 (28 Dec 2017 05:14)  T(F): 98.2 (28 Dec 2017 17:16), Max: 98.8 (28 Dec 2017 05:14)  HR: 102 (28 Dec 2017 17:16) (97 - 104)  BP: 128/68 (28 Dec 2017 17:16) (119/63 - 137/76)  BP(mean): --  RR: 16 (28 Dec 2017 17:16) (16 - 17)  SpO2: 95% (28 Dec 2017 17:16) (95% - 100%)    Drug Dosing Weight  Height (cm): 170.18 (27 Dec 2017 07:33)  Weight (kg): 50.8 (27 Dec 2017 07:33)  BMI (kg/m2): 17.5 (27 Dec 2017 07:33)  BSA (m2): 1.58 (27 Dec 2017 07:33)     Physical Exam:  · Constitutional	detailed exam  · Constitutional Details	well-developed; well-groomed  · Eyes	EOMI; PERRL; no drainage or redness  · ENMT Comments	dry mucous membranes  · Respiratory	detailed exam  · Respiratory Comments	normal breath sounds at the lung bases bilaterally  · Cardiovascular	Regular rate & rhythm, normal S1, S2; no murmurs, gallops or rubs; no S3, S4  · Abd-Soft non tender  ·Ext-no edema, clubbing or cyanosis    URINARY CATHETER: [ ] YES [ ] NO     LABS:  CBC Full  -  ( 28 Dec 2017 07:04 )  WBC Count : 27.3 K/uL  Hemoglobin : 12.4 g/dL  Hematocrit : 37.7 %  Platelet Count - Automated : 173 K/uL  Mean Cell Volume : 88.3 fL  Mean Cell Hemoglobin : 29.0 pg  Mean Cell Hemoglobin Concentration : 32.9 g/dL  Auto Neutrophil # : x  Auto Lymphocyte # : x  Auto Monocyte # : x  Auto Eosinophil # : x  Auto Basophil # : x  Auto Neutrophil % : x  Auto Lymphocyte % : x  Auto Monocyte % : x  Auto Eosinophil % : x  Auto Basophil % : x    12-28    142  |  104  |  8   ----------------------------<  118<H>  4.3   |  26  |  0.69    Ca    9.5      28 Dec 2017 07:04  Phos  2.9     12-28  Mg     2.1     12-28            CULTURES:    RADIOLOGY & ADDITIONAL STUDIES:

## 2017-12-28 NOTE — PROGRESS NOTE ADULT - ASSESSMENT
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    Problem 1: Failed medical treatement for ITP, s/p splenectomy 12/27, Post op care.  - AM labs  - Pain and nausea control  - CLD/IVF  - OOB/IS  - SCD/SQH    Problem 2 : Factor XII deficiency   - stable    Problem 3 : Uterine ca  - s/p hysterectomy, stable    Problem 4 : Dispo  - home pending hospital course 71yo F with hx of uterine cancer s/p hysterectomy, factor XII deficiency, chronic ITP and not resposive to medical therapy s/p robotic assisted splenectomy 12/27.    Problem 1: Failed medical treatement for ITP, s/p splenectomy 12/27, Post op care.  - AM labs  - Pain and nausea control  - CLD/IVF  - OOB/IS  - SCD/SQH  - ice pack for the shoulder pain    Problem 2 : Factor XII deficiency   - stable    Problem 3 : Uterine ca  - s/p hysterectomy, stable    Problem 4 : Dispo  - home pending hospital course

## 2017-12-28 NOTE — PHYSICAL THERAPY INITIAL EVALUATION ADULT - GENERAL OBSERVATIONS, REHAB EVAL
Pt sitting at EOB, +intravenous line, reporting pain Pt sitting at EOB, +intravenous line, +STEVE self suction, reporting pain at sx incision site and Left shoulder (not reporting number when asked)

## 2017-12-28 NOTE — PROGRESS NOTE ADULT - SUBJECTIVE AND OBJECTIVE BOX
ON: FROILAN PRATER  12/27 : Underwent robotic assisted splenectomy 2/2 ITP. POC WNL. Discussed with Dr. Caldwell, patient last recieved Decadron 40 mg PO on 11/30. Feels she doesnt need Steroids. She will see her in AM. ON: FROILAN PRATER  12/27 : Underwent robotic assisted splenectomy 2/2 ITP. POC WNL. Discussed with Dr. Caldwell, patient last recieved Decadron 40 mg PO on 11/30. Feels she doesnt need Steroids. She will see her in AM.     STATUS POST:  robotic assisted splenectomy POD1     SUBJECTIVE: Patient seen and examined bedside by chief resident. C/O pain over the left shoulder, no nausea/vomiting, mild abd pain.    heparin  Injectable 5000 Unit(s) SubCutaneous every 8 hours      Vital Signs Last 24 Hrs  T(C): 37.1 (28 Dec 2017 05:14), Max: 37.1 (28 Dec 2017 05:14)  T(F): 98.8 (28 Dec 2017 05:14), Max: 98.8 (28 Dec 2017 05:14)  HR: 97 (28 Dec 2017 05:14) (84 - 104)  BP: 128/72 (28 Dec 2017 05:14) (119/59 - 147/65)  BP(mean): 79 (27 Dec 2017 14:09) (79 - 102)  RR: 17 (28 Dec 2017 05:14) (6 - 20)  SpO2: 97% (28 Dec 2017 05:14) (97% - 100%)  I&O's Detail    27 Dec 2017 07:01  -  28 Dec 2017 07:00  --------------------------------------------------------  IN:    lactated ringers.: 1800 mL    Oral Fluid: 150 mL  Total IN: 1950 mL    OUT:    Drain: 50 mL    Voided: 2325 mL  Total OUT: 2375 mL    Total NET: -425 mL          General: NAD, resting comfortably in bed  C/V: NSR  Pulm: Nonlabored breathing, no respiratory distress  Abd: soft, NT/ND. incisions C/D/I  Extrem: WWP, no edema, SCDs in place        LABS:                        12.4   27.3  )-----------( 173      ( 28 Dec 2017 07:04 )             37.7     12-28    142  |  104  |  8   ----------------------------<  118<H>  4.3   |  26  |  0.69    Ca    9.5      28 Dec 2017 07:04  Phos  2.9     12-28  Mg     2.1     12-28            RADIOLOGY & ADDITIONAL STUDIES:

## 2017-12-29 ENCOUNTER — TRANSCRIPTION ENCOUNTER (OUTPATIENT)
Age: 72
End: 2017-12-29

## 2017-12-29 VITALS
TEMPERATURE: 98 F | HEART RATE: 84 BPM | OXYGEN SATURATION: 98 % | DIASTOLIC BLOOD PRESSURE: 70 MMHG | SYSTOLIC BLOOD PRESSURE: 140 MMHG | RESPIRATION RATE: 16 BRPM

## 2017-12-29 LAB
ANION GAP SERPL CALC-SCNC: 13 MMOL/L — SIGNIFICANT CHANGE UP (ref 5–17)
BUN SERPL-MCNC: 7 MG/DL — SIGNIFICANT CHANGE UP (ref 7–23)
CALCIUM SERPL-MCNC: 8.5 MG/DL — SIGNIFICANT CHANGE UP (ref 8.4–10.5)
CHLORIDE SERPL-SCNC: 101 MMOL/L — SIGNIFICANT CHANGE UP (ref 96–108)
CO2 SERPL-SCNC: 25 MMOL/L — SIGNIFICANT CHANGE UP (ref 22–31)
CREAT SERPL-MCNC: 0.62 MG/DL — SIGNIFICANT CHANGE UP (ref 0.5–1.3)
GLUCOSE SERPL-MCNC: 91 MG/DL — SIGNIFICANT CHANGE UP (ref 70–99)
HCT VFR BLD CALC: 35.7 % — SIGNIFICANT CHANGE UP (ref 34.5–45)
HGB BLD-MCNC: 11.3 G/DL — LOW (ref 11.5–15.5)
MAGNESIUM SERPL-MCNC: 2 MG/DL — SIGNIFICANT CHANGE UP (ref 1.6–2.6)
MCHC RBC-ENTMCNC: 28.6 PG — SIGNIFICANT CHANGE UP (ref 27–34)
MCHC RBC-ENTMCNC: 31.7 G/DL — LOW (ref 32–36)
MCV RBC AUTO: 90.4 FL — SIGNIFICANT CHANGE UP (ref 80–100)
PHOSPHATE SERPL-MCNC: 1.9 MG/DL — LOW (ref 2.5–4.5)
PLATELET # BLD AUTO: 196 K/UL — SIGNIFICANT CHANGE UP (ref 150–400)
POTASSIUM SERPL-MCNC: 3.5 MMOL/L — SIGNIFICANT CHANGE UP (ref 3.5–5.3)
POTASSIUM SERPL-SCNC: 3.5 MMOL/L — SIGNIFICANT CHANGE UP (ref 3.5–5.3)
RBC # BLD: 3.95 M/UL — SIGNIFICANT CHANGE UP (ref 3.8–5.2)
RBC # FLD: 13.5 % — SIGNIFICANT CHANGE UP (ref 10.3–16.9)
SODIUM SERPL-SCNC: 139 MMOL/L — SIGNIFICANT CHANGE UP (ref 135–145)
WBC # BLD: 16.2 K/UL — HIGH (ref 3.8–10.5)
WBC # FLD AUTO: 16.2 K/UL — HIGH (ref 3.8–10.5)

## 2017-12-29 PROCEDURE — 97161 PT EVAL LOW COMPLEX 20 MIN: CPT

## 2017-12-29 PROCEDURE — 86900 BLOOD TYPING SEROLOGIC ABO: CPT

## 2017-12-29 PROCEDURE — 84100 ASSAY OF PHOSPHORUS: CPT

## 2017-12-29 PROCEDURE — 85027 COMPLETE CBC AUTOMATED: CPT

## 2017-12-29 PROCEDURE — 80048 BASIC METABOLIC PNL TOTAL CA: CPT

## 2017-12-29 PROCEDURE — 36415 COLL VENOUS BLD VENIPUNCTURE: CPT

## 2017-12-29 PROCEDURE — 83735 ASSAY OF MAGNESIUM: CPT

## 2017-12-29 PROCEDURE — 99231 SBSQ HOSP IP/OBS SF/LOW 25: CPT

## 2017-12-29 PROCEDURE — S2900: CPT

## 2017-12-29 PROCEDURE — C9399: CPT

## 2017-12-29 PROCEDURE — 88305 TISSUE EXAM BY PATHOLOGIST: CPT

## 2017-12-29 PROCEDURE — 86901 BLOOD TYPING SEROLOGIC RH(D): CPT

## 2017-12-29 PROCEDURE — 86850 RBC ANTIBODY SCREEN: CPT

## 2017-12-29 RX ORDER — DOCUSATE SODIUM 100 MG
1 CAPSULE ORAL
Qty: 10 | Refills: 0
Start: 2017-12-29 | End: 2018-01-02

## 2017-12-29 RX ORDER — ACETAMINOPHEN 500 MG
1000 TABLET ORAL ONCE
Qty: 0 | Refills: 0 | Status: COMPLETED | OUTPATIENT
Start: 2017-12-29 | End: 2017-12-29

## 2017-12-29 RX ORDER — POTASSIUM CHLORIDE 20 MEQ
40 PACKET (EA) ORAL ONCE
Qty: 0 | Refills: 0 | Status: COMPLETED | OUTPATIENT
Start: 2017-12-29 | End: 2017-12-29

## 2017-12-29 RX ORDER — OXYCODONE AND ACETAMINOPHEN 5; 325 MG/1; MG/1
1 TABLET ORAL
Qty: 16 | Refills: 0
Start: 2017-12-29 | End: 2018-01-01

## 2017-12-29 RX ORDER — POTASSIUM PHOSPHATE, MONOBASIC POTASSIUM PHOSPHATE, DIBASIC 236; 224 MG/ML; MG/ML
15 INJECTION, SOLUTION INTRAVENOUS ONCE
Qty: 0 | Refills: 0 | Status: COMPLETED | OUTPATIENT
Start: 2017-12-29 | End: 2017-12-29

## 2017-12-29 RX ADMIN — Medication 400 MILLIGRAM(S): at 10:45

## 2017-12-29 RX ADMIN — HEPARIN SODIUM 5000 UNIT(S): 5000 INJECTION INTRAVENOUS; SUBCUTANEOUS at 05:30

## 2017-12-29 RX ADMIN — POTASSIUM PHOSPHATE, MONOBASIC POTASSIUM PHOSPHATE, DIBASIC 62.5 MILLIMOLE(S): 236; 224 INJECTION, SOLUTION INTRAVENOUS at 10:47

## 2017-12-29 RX ADMIN — Medication 40 MILLIEQUIVALENT(S): at 08:19

## 2017-12-29 NOTE — DISCHARGE NOTE ADULT - PATIENT PORTAL LINK FT
“You can access the [employer] Patient Portal, offered by [facility], by registering with the following website: [email or web address]”

## 2017-12-29 NOTE — PROGRESS NOTE ADULT - ASSESSMENT
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    Problem 1: Failed medical treatement for ITP, s/p splenectomy 12/27, Post op care.  - AM labs  - Pain and nausea control  - FLD/IVF  - OOB/IS  - SCD/SQH    Problem 2 : Factor XII deficiency   - stable    Problem 3 : Uterine ca  - s/p hysterectomy, stable    Problem 4 : Dispo  - home pending hospital course

## 2017-12-29 NOTE — PROGRESS NOTE ADULT - SUBJECTIVE AND OBJECTIVE BOX
STATUS POST:       SUBJECTIVE: Patient seen and examined bedside by chief resident.    heparin  Injectable 5000 Unit(s) SubCutaneous every 8 hours      Vital Signs Last 24 Hrs  T(C): 37.3 (29 Dec 2017 06:11), Max: 37.3 (29 Dec 2017 06:11)  T(F): 99.2 (29 Dec 2017 06:11), Max: 99.2 (29 Dec 2017 06:11)  HR: 90 (29 Dec 2017 06:11) (90 - 103)  BP: 135/75 (29 Dec 2017 06:11) (124/74 - 140/71)  BP(mean): --  RR: 16 (29 Dec 2017 06:11) (16 - 17)  SpO2: 95% (29 Dec 2017 06:11) (95% - 97%)  I&O's Detail    28 Dec 2017 07:01  -  29 Dec 2017 07:00  --------------------------------------------------------  IN:    lactated ringers.: 2310 mL    Oral Fluid: 650 mL  Total IN: 2960 mL    OUT:    Drain: 40 mL    Voided: 2400 mL  Total OUT: 2440 mL    Total NET: 520 mL          General: NAD, resting comfortably in bed  C/V: NSR  Pulm: Nonlabored breathing, no respiratory distress  Abd: soft, NT/ND.  Extrem: WWP, no edema, SCDs in place        LABS:                        11.3   16.2  )-----------( 196      ( 29 Dec 2017 06:57 )             35.7     12-29    139  |  101  |  7   ----------------------------<  91  3.5   |  25  |  0.62    Ca    8.5      29 Dec 2017 06:56  Phos  1.9     12-29  Mg     2.0     12-29            RADIOLOGY & ADDITIONAL STUDIES: STATUS POST:  12/27 : robotic assisted splenectomy      SUBJECTIVE: Patient seen and examined bedside by chief resident. Tolerating CLD.     heparin  Injectable 5000 Unit(s) SubCutaneous every 8 hours      Vital Signs Last 24 Hrs  T(C): 37.3 (29 Dec 2017 06:11), Max: 37.3 (29 Dec 2017 06:11)  T(F): 99.2 (29 Dec 2017 06:11), Max: 99.2 (29 Dec 2017 06:11)  HR: 90 (29 Dec 2017 06:11) (90 - 103)  BP: 135/75 (29 Dec 2017 06:11) (124/74 - 140/71)  BP(mean): --  RR: 16 (29 Dec 2017 06:11) (16 - 17)  SpO2: 95% (29 Dec 2017 06:11) (95% - 97%)  I&O's Detail    28 Dec 2017 07:01  -  29 Dec 2017 07:00  --------------------------------------------------------  IN:    lactated ringers.: 2310 mL    Oral Fluid: 650 mL  Total IN: 2960 mL    OUT:    Drain: 40 mL    Voided: 2400 mL  Total OUT: 2440 mL    Total NET: 520 mL        General: NAD, resting comfortably in bed  C/V: NSR  Pulm: Nonlabored breathing, no respiratory distress  Abd: soft, ND, appropriate incisional tenderness. STEVE with serosanguinous output.  Extrem: WWP, no edema, SCDs in place        LABS:                        11.3   16.2  )-----------( 196      ( 29 Dec 2017 06:57 )             35.7     12-29    139  |  101  |  7   ----------------------------<  91  3.5   |  25  |  0.62    Ca    8.5      29 Dec 2017 06:56  Phos  1.9     12-29  Mg     2.0     12-29            RADIOLOGY & ADDITIONAL STUDIES:

## 2017-12-29 NOTE — DISCHARGE NOTE ADULT - MEDICATION SUMMARY - MEDICATIONS TO TAKE
I will START or STAY ON the medications listed below when I get home from the hospital:    oxyCODONE-acetaminophen 5 mg-325 mg oral tablet  -- 1 tab(s) by mouth every 6 hours, As Needed -for severe pain MDD:4   -- Caution federal law prohibits the transfer of this drug to any person other  than the person for whom it was prescribed.  May cause drowsiness.  Alcohol may intensify this effect.  Use care when operating dangerous machinery.  This prescription cannot be refilled.  This product contains acetaminophen.  Do not use  with any other product containing acetaminophen to prevent possible liver damage.  Using more of this medication than prescribed may cause serious breathing problems.    -- Indication: For For severe pain (7-10)    Colace 100 mg oral capsule  -- 1 cap(s) by mouth 2 times a day MDD:2  -- Medication should be taken with plenty of water.    -- Indication: For to prevent constipation

## 2017-12-29 NOTE — DISCHARGE NOTE ADULT - PLAN OF CARE
Follow up Please follow up with Dr. Mary Bourgeois next week. Call his office to schedule an appointment: [phone].    Contact your doctor or go to the ER for fever > 101.5, chills, nausea, vomiting, chest pain, shortness of breath, pain not controlled by medication or excessive bleeding. Pain control For pain control, you may take Percocet for severe pain not helped by Tylenol or Advil. Take Percocet 1-2 tabs, every 4-6 hours, as needed. Do not take Tylenol and Percocet together. While taking Percocet, you may want to take Colace (a stool softener), as the narcotics may cause constipation.

## 2017-12-29 NOTE — PROGRESS NOTE ADULT - SUBJECTIVE AND OBJECTIVE BOX
HEALTH ISSUES - PROBLEM Dx:  Factor XII deficiency: Factor XII deficiency  Idiopathic thrombocytopenia purpura: Idiopathic thrombocytopenia purpura          CHEMOTHERAPY REGIMEN:        Day:                          Diet:  Protocol:                                    IVF:      MEDICATIONS  (STANDING):  BUpivacaine liposome 1.3% Injectable (no eMAR) 20 milliLiter(s) Local Injection Once  heparin  Injectable 5000 Unit(s) SubCutaneous every 8 hours  lactated ringers. 1000 milliLiter(s) (100 mL/Hr) IV Continuous <Continuous>    MEDICATIONS  (PRN):  HYDROmorphone  Injectable 0.5 milliGRAM(s) IV Push every 4 hours PRN Breakthrough pain  ondansetron Injectable 4 milliGRAM(s) IV Push every 6 hours PRN Nausea and/or Vomiting  oxyCODONE    5 mG/acetaminophen 325 mG 1 Tablet(s) Oral every 4 hours PRN Moderate Pain (4 - 6)  oxyCODONE    5 mG/acetaminophen 325 mG 2 Tablet(s) Oral every 4 hours PRN Severe Pain (7 - 10)      Allergies    sulfa drugs (Unknown)    Intolerances        DVT Prophylaxis: [ ] YES [ ] NO      Antibiotics: [ ] YES [ ] NO    Pain Scale (1-10):       Location:    Vital Signs Last 24 Hrs  T(C): 36.9 (29 Dec 2017 09:15), Max: 37.3 (29 Dec 2017 06:11)  T(F): 98.4 (29 Dec 2017 09:15), Max: 99.2 (29 Dec 2017 06:11)  HR: 87 (29 Dec 2017 09:15) (87 - 102)  BP: 154/73 (29 Dec 2017 09:15) (128/68 - 154/73)  BP(mean): --  RR: 17 (29 Dec 2017 09:15) (16 - 17)  SpO2: 98% (29 Dec 2017 09:15) (95% - 98%)    Drug Dosing Weight  Height (cm): 170.18 (27 Dec 2017 07:33)  Weight (kg): 50.8 (27 Dec 2017 07:33)  BMI (kg/m2): 17.5 (27 Dec 2017 07:33)  BSA (m2): 1.58 (27 Dec 2017 07:33)     Physical Exam:  · Constitutional	detailed exam  · Constitutional Details	well-developed; well-groomed  · Eyes	EOMI; PERRL; no drainage or redness  · ENMT Comments	dry mucous membranes  · Respiratory	detailed exam  · Respiratory Comments	normal breath sounds at the lung bases bilaterally  · Cardiovascular	Regular rate & rhythm, normal S1, S2; no murmurs, gallops or rubs; no S3, S4  · Abd-Soft non tender  ·Ext-no edema, clubbing or cyanosis    URINARY CATHETER: [ ] YES [ ] NO     LABS:  CBC Full  -  ( 29 Dec 2017 06:57 )  WBC Count : 16.2 K/uL  Hemoglobin : 11.3 g/dL  Hematocrit : 35.7 %  Platelet Count - Automated : 196 K/uL  Mean Cell Volume : 90.4 fL  Mean Cell Hemoglobin : 28.6 pg  Mean Cell Hemoglobin Concentration : 31.7 g/dL  Auto Neutrophil # : x  Auto Lymphocyte # : x  Auto Monocyte # : x  Auto Eosinophil # : x  Auto Basophil # : x  Auto Neutrophil % : x  Auto Lymphocyte % : x  Auto Monocyte % : x  Auto Eosinophil % : x  Auto Basophil % : x    12-29    139  |  101  |  7   ----------------------------<  91  3.5   |  25  |  0.62    Ca    8.5      29 Dec 2017 06:56  Phos  1.9     12-29  Mg     2.0     12-29            CULTURES:    RADIOLOGY & ADDITIONAL STUDIES:

## 2017-12-29 NOTE — DISCHARGE NOTE ADULT - CARE PROVIDER_API CALL
Mary Bourgeois), Surgery; Surgical Oncology  [address]  [address]  [place] [address]  Phone: [phone]  Fax: [phone]

## 2017-12-29 NOTE — DISCHARGE NOTE ADULT - HOSPITAL COURSE
72F with h/o chronic ITP previously treated with steroids and one round of IV IG. Patient not responsive to medical therapy and thus presented for an elective robot assisted splenectomy. On 12/27/2017, she underwent robot-assisted splenectomy. She tolerated her diet during hospitalization. She was discharged in stable condition and vitals within normal limits.

## 2017-12-29 NOTE — DISCHARGE NOTE ADULT - CARE PLAN
Principal Discharge DX:	Idiopathic thrombocytopenia purpura  Goal:	Follow up  Instructions for follow-up, activity and diet:	Please follow up with Dr. Mary Bourgeois next week. Call his office to schedule an appointment: [phone].    Contact your doctor or go to the ER for fever > 101.5, chills, nausea, vomiting, chest pain, shortness of breath, pain not controlled by medication or excessive bleeding.  Goal:	Pain control  Instructions for follow-up, activity and diet:	For pain control, you may take Percocet for severe pain not helped by Tylenol or Advil. Take Percocet 1-2 tabs, every 4-6 hours, as needed. Do not take Tylenol and Percocet together. While taking Percocet, you may want to take Colace (a stool softener), as the narcotics may cause constipation.

## 2018-01-02 DIAGNOSIS — D69.3 IMMUNE THROMBOCYTOPENIC PURPURA: ICD-10-CM

## 2018-01-02 DIAGNOSIS — D68.2 HEREDITARY DEFICIENCY OF OTHER CLOTTING FACTORS: ICD-10-CM

## 2018-01-02 DIAGNOSIS — Z85.42 PERSONAL HISTORY OF MALIGNANT NEOPLASM OF OTHER PARTS OF UTERUS: ICD-10-CM

## 2018-01-02 PROBLEM — C55 MALIGNANT NEOPLASM OF UTERUS, PART UNSPECIFIED: Chronic | Status: ACTIVE | Noted: 2017-12-26

## 2018-01-05 ENCOUNTER — APPOINTMENT (OUTPATIENT)
Dept: HEMATOLOGY ONCOLOGY | Facility: CLINIC | Age: 73
End: 2018-01-05

## 2018-06-25 NOTE — PATIENT PROFILE ADULT. - TEACHING/LEARNING LEARNING PREFERENCES
1.  JACKELINE w/ PEK OU- (H/o Plugs LLs OU) Cont ATs TID-QID OU Routinely. Cont AT Meliton QHS OU. 2.  Cataract OU: Observe for now without intervention. The patient was advised to contact us if any change or worsening of vision3. Allergic Conjunctivitis OU- Cont Zaditor BID OU. 4.  COAG Suspect OU (CD 0.8 OU) Neg Fm Hx. Past w/u negative. Patient considered Low RIsk. Return for an appointment in 6 mo 30 with Dr. Anu Barahona.
Cataract OU: Observe for now without intervention.  The patient was advised to contact us if any change or worsening of vision
audio

## 2018-07-17 ENCOUNTER — APPOINTMENT (OUTPATIENT)
Dept: HEMATOLOGY ONCOLOGY | Facility: CLINIC | Age: 73
End: 2018-07-17
Payer: MEDICARE

## 2018-07-17 VITALS
BODY MASS INDEX: 18.05 KG/M2 | HEIGHT: 67 IN | WEIGHT: 115 LBS | HEART RATE: 77 BPM | SYSTOLIC BLOOD PRESSURE: 150 MMHG | OXYGEN SATURATION: 96 % | RESPIRATION RATE: 14 BRPM | DIASTOLIC BLOOD PRESSURE: 74 MMHG | TEMPERATURE: 98.2 F

## 2018-07-17 DIAGNOSIS — D68.2 HEREDITARY DEFICIENCY OF OTHER CLOTTING FACTORS: ICD-10-CM

## 2018-07-17 DIAGNOSIS — D69.3 IMMUNE THROMBOCYTOPENIC PURPURA: ICD-10-CM

## 2018-07-17 DIAGNOSIS — C55 MALIGNANT NEOPLASM OF UTERUS, PART UNSPECIFIED: ICD-10-CM

## 2018-07-17 PROCEDURE — 36415 COLL VENOUS BLD VENIPUNCTURE: CPT

## 2018-07-17 PROCEDURE — 99214 OFFICE O/P EST MOD 30 MIN: CPT | Mod: 25

## 2018-07-17 RX ORDER — CYANOCOBALAMIN 1000 UG/ML
1000 INJECTION INTRAMUSCULAR; SUBCUTANEOUS
Qty: 0 | Refills: 0 | Status: COMPLETED | OUTPATIENT
Start: 2018-07-17

## 2018-07-17 RX ADMIN — CYANOCOBALAMIN 0 MCG/ML: 1000 INJECTION INTRAMUSCULAR; SUBCUTANEOUS at 00:00

## 2018-07-18 LAB
25(OH)D3 SERPL-MCNC: 28.5 NG/ML
ALBUMIN SERPL ELPH-MCNC: 4.5 G/DL
ALP BLD-CCNC: 73 U/L
ALT SERPL-CCNC: 13 U/L
ANION GAP SERPL CALC-SCNC: 14 MMOL/L
AST SERPL-CCNC: 21 U/L
BASOPHILS # BLD AUTO: 0.04 K/UL
BASOPHILS NFR BLD AUTO: 0.5 %
BILIRUB SERPL-MCNC: 0.2 MG/DL
BUN SERPL-MCNC: 20 MG/DL
CALCIUM SERPL-MCNC: 9.6 MG/DL
CHLORIDE SERPL-SCNC: 103 MMOL/L
CO2 SERPL-SCNC: 24 MMOL/L
CREAT SERPL-MCNC: 0.76 MG/DL
EOSINOPHIL # BLD AUTO: 0.07 K/UL
EOSINOPHIL NFR BLD AUTO: 1 %
ERYTHROCYTE [SEDIMENTATION RATE] IN BLOOD BY WESTERGREN METHOD: 24 MM/HR
GLUCOSE SERPL-MCNC: 75 MG/DL
HCT VFR BLD CALC: 45.4 %
HGB BLD-MCNC: 14.1 G/DL
IMM GRANULOCYTES NFR BLD AUTO: 0.3 %
LDH SERPL-CCNC: 406 U/L
LYMPHOCYTES # BLD AUTO: 1.53 K/UL
LYMPHOCYTES NFR BLD AUTO: 20.8 %
MAN DIFF?: NORMAL
MCHC RBC-ENTMCNC: 29 PG
MCHC RBC-ENTMCNC: 31.1 GM/DL
MCV RBC AUTO: 93.4 FL
MONOCYTES # BLD AUTO: 0.45 K/UL
MONOCYTES NFR BLD AUTO: 6.1 %
NEUTROPHILS # BLD AUTO: 5.23 K/UL
NEUTROPHILS NFR BLD AUTO: 71.3 %
PLATELET # BLD AUTO: 367 K/UL
POTASSIUM SERPL-SCNC: 4.7 MMOL/L
PROT SERPL-MCNC: 6.9 G/DL
RBC # BLD: 4.86 M/UL
RBC # FLD: 15 %
SODIUM SERPL-SCNC: 141 MMOL/L
VIT B12 SERPL-MCNC: 1179 PG/ML
WBC # FLD AUTO: 7.34 K/UL

## 2019-07-09 NOTE — ED PROVIDER NOTE - CADM POA PRESS ULCER
Contacted pt. Injection scheduled. Pre injection instructions taught and mailed. Orders entered. Pt was able to verbalize all understanding. All questions answered.   No

## 2020-02-26 NOTE — H&P ADULT - NSHPOUTPATIENTPROVIDERS_GEN_ALL_CORE
Chief Complaint   Patient presents with     Dysuria     1 week now     MP/MA   Dr. Caldwell (hematologist)

## 2021-04-23 PROBLEM — D69.3 IMMUNE THROMBOCYTOPENIC PURPURA: Chronic | Status: ACTIVE | Noted: 2017-11-28

## 2021-04-27 ENCOUNTER — NON-APPOINTMENT (OUTPATIENT)
Age: 76
End: 2021-04-27

## 2021-05-11 ENCOUNTER — APPOINTMENT (OUTPATIENT)
Dept: OPHTHALMOLOGY | Facility: CLINIC | Age: 76
End: 2021-05-11
Payer: MEDICARE

## 2021-05-11 ENCOUNTER — APPOINTMENT (OUTPATIENT)
Dept: OPHTHALMOLOGY | Facility: CLINIC | Age: 76
End: 2021-05-11

## 2021-05-11 ENCOUNTER — NON-APPOINTMENT (OUTPATIENT)
Age: 76
End: 2021-05-11

## 2021-05-11 PROCEDURE — 92133 CPTRZD OPH DX IMG PST SGM ON: CPT

## 2021-05-11 PROCEDURE — 92020 GONIOSCOPY: CPT

## 2021-05-11 PROCEDURE — 92004 COMPRE OPH EXAM NEW PT 1/>: CPT

## 2021-11-09 ENCOUNTER — NON-APPOINTMENT (OUTPATIENT)
Age: 76
End: 2021-11-09

## 2021-11-09 ENCOUNTER — APPOINTMENT (OUTPATIENT)
Dept: OPHTHALMOLOGY | Facility: CLINIC | Age: 76
End: 2021-11-09
Payer: MEDICARE

## 2021-11-09 PROCEDURE — 92133 CPTRZD OPH DX IMG PST SGM ON: CPT

## 2021-11-09 PROCEDURE — 92014 COMPRE OPH EXAM EST PT 1/>: CPT

## 2021-11-10 NOTE — ASU PATIENT PROFILE, ADULT - PRO TOBACCO TYPE
"Oncology Rooming Note    November 10, 2021 12:09 PM   Essie Guzman is a 67 year old male who presents for:    Chief Complaint   Patient presents with     Blood Draw     Vitals, blood drawn and PIV placed by SIDDHARTH Anglin RN. Pt checked into appt.     Oncology Clinic Visit     [facility] RETURN - GASTRIC CANCER     Initial Vitals: /84   Pulse 99   Temp 97  F (36.1  C) (Oral)   Resp 16   Ht 1.549 m (5' 0.98\")   Wt 47.7 kg (105 lb 3.2 oz)   SpO2 99%   BMI 19.89 kg/m   Estimated body mass index is 19.89 kg/m  as calculated from the following:    Height as of this encounter: 1.549 m (5' 0.98\").    Weight as of this encounter: 47.7 kg (105 lb 3.2 oz). Body surface area is 1.43 meters squared.  Extreme Pain (8) Comment: Data Unavailable   No LMP for male patient.  Allergies reviewed: Yes  Medications reviewed: Yes    Medications: Medication refills not needed today.  Pharmacy name entered into [employer]:    [id number] - [place], [place] - [address]  [id number] - [place], [place] - [address]  [facility] [address], [place] - [address]    Clinical concerns: No new concerns. Fabio was notified.      David Harding LPN            "
Chief Complaint   Patient presents with     Blood Draw     Vitals, blood drawn and PIV placed by SIDDHARTH Anglin RN. Pt checked into appt.     ALLI Vang LPN  
cigarettes

## 2022-05-09 ENCOUNTER — NON-APPOINTMENT (OUTPATIENT)
Age: 77
End: 2022-05-09

## 2022-05-09 ENCOUNTER — APPOINTMENT (OUTPATIENT)
Dept: OPHTHALMOLOGY | Facility: CLINIC | Age: 77
End: 2022-05-09
Payer: MEDICARE

## 2022-05-09 PROCEDURE — 92014 COMPRE OPH EXAM EST PT 1/>: CPT

## 2022-05-09 PROCEDURE — 92136 OPHTHALMIC BIOMETRY: CPT

## 2022-05-09 PROCEDURE — 92133 CPTRZD OPH DX IMG PST SGM ON: CPT

## 2022-11-07 ENCOUNTER — APPOINTMENT (OUTPATIENT)
Dept: OPHTHALMOLOGY | Facility: CLINIC | Age: 77
End: 2022-11-07

## 2022-11-07 ENCOUNTER — NON-APPOINTMENT (OUTPATIENT)
Age: 77
End: 2022-11-07

## 2022-11-07 PROCEDURE — 92133 CPTRZD OPH DX IMG PST SGM ON: CPT

## 2022-11-07 PROCEDURE — 92014 COMPRE OPH EXAM EST PT 1/>: CPT

## 2023-05-08 ENCOUNTER — APPOINTMENT (OUTPATIENT)
Dept: OPHTHALMOLOGY | Facility: CLINIC | Age: 78
End: 2023-05-08

## 2023-05-09 ENCOUNTER — NON-APPOINTMENT (OUTPATIENT)
Age: 78
End: 2023-05-09

## 2023-05-09 ENCOUNTER — APPOINTMENT (OUTPATIENT)
Dept: OPHTHALMOLOGY | Facility: CLINIC | Age: 78
End: 2023-05-09
Payer: MEDICARE

## 2023-05-09 PROCEDURE — 92014 COMPRE OPH EXAM EST PT 1/>: CPT

## 2023-05-09 PROCEDURE — 92133 CPTRZD OPH DX IMG PST SGM ON: CPT

## 2023-11-15 ENCOUNTER — APPOINTMENT (OUTPATIENT)
Dept: OPHTHALMOLOGY | Facility: CLINIC | Age: 78
End: 2023-11-15
Payer: MEDICARE

## 2023-11-15 ENCOUNTER — APPOINTMENT (OUTPATIENT)
Dept: OPHTHALMOLOGY | Facility: CLINIC | Age: 78
End: 2023-11-15
Payer: SELF-PAY

## 2023-11-15 ENCOUNTER — NON-APPOINTMENT (OUTPATIENT)
Age: 78
End: 2023-11-15

## 2023-11-15 PROCEDURE — 92136 OPHTHALMIC BIOMETRY: CPT

## 2023-11-15 PROCEDURE — 92014 COMPRE OPH EXAM EST PT 1/>: CPT

## 2023-11-15 PROCEDURE — 92025 CPTRIZED CORNEAL TOPOGRAPHY: CPT

## 2023-11-15 PROCEDURE — 92015 DETERMINE REFRACTIVE STATE: CPT

## 2023-11-15 PROCEDURE — 92250 FUNDUS PHOTOGRAPHY W/I&R: CPT

## 2024-02-12 ENCOUNTER — APPOINTMENT (OUTPATIENT)
Dept: OPHTHALMOLOGY | Facility: CLINIC | Age: 79
End: 2024-02-12

## 2024-04-16 ENCOUNTER — NON-APPOINTMENT (OUTPATIENT)
Age: 79
End: 2024-04-16

## 2024-04-16 ENCOUNTER — APPOINTMENT (OUTPATIENT)
Dept: OPHTHALMOLOGY | Facility: CLINIC | Age: 79
End: 2024-04-16
Payer: MEDICARE

## 2024-04-16 PROCEDURE — 92136 OPHTHALMIC BIOMETRY: CPT

## 2024-04-16 PROCEDURE — 92012 INTRM OPH EXAM EST PATIENT: CPT

## 2024-04-16 PROCEDURE — 92250 FUNDUS PHOTOGRAPHY W/I&R: CPT

## 2024-05-09 NOTE — ASU PATIENT PROFILE, ADULT - FALL HARM RISK - UNIVERSAL INTERVENTIONS
Bed in lowest position, wheels locked, appropriate side rails in place/Call bell, personal items and telephone in reach/Instruct patient to call for assistance before getting out of bed or chair/Non-slip footwear when patient is out of bed/[place] to call system/Physically safe environment - no spills, clutter or unnecessary equipment/Purposeful Proactive Rounding/Room/bathroom lighting operational, light cord in reach
Known

## 2024-05-09 NOTE — ASU PATIENT PROFILE, ADULT - NSICDXPASTSURGICALHX_GEN_ALL_CORE_FT
PAST SURGICAL HISTORY:  H/O abdominal hysterectomy     Status post cholecystectomy     Surgery, elective spleeen removal

## 2024-05-09 NOTE — ASU PATIENT PROFILE, ADULT - NS PREOP UNDERSTANDS INFO
No solid/dairy/candy/gum after midnight tonight, water allowed before 05:30am tomorrow, reminded to come with photo ID/insurance/credit card, arrange for escort, escort must have photo ID, no smoking/alcohol drinking/illicit drug use today; dress in comfortable clothes, no jewelries/contact lens; address and phone number was given./yes

## 2024-05-09 NOTE — ASU PATIENT PROFILE, ADULT - NSICDXPASTMEDICALHX_GEN_ALL_CORE_FT
PAST MEDICAL HISTORY:  Factor XII deficiency     HTN (hypertension)     Hyperlipidemia     Idiopathic thrombocytopenia purpura     Uterine carcinoma

## 2024-05-10 ENCOUNTER — NON-APPOINTMENT (OUTPATIENT)
Age: 79
End: 2024-05-10

## 2024-05-10 ENCOUNTER — TRANSCRIPTION ENCOUNTER (OUTPATIENT)
Age: 79
End: 2024-05-10

## 2024-05-10 ENCOUNTER — OUTPATIENT (OUTPATIENT)
Dept: OUTPATIENT SERVICES | Facility: HOSPITAL | Age: 79
LOS: 1 days | Discharge: HOME | End: 2024-05-10
Payer: MEDICARE

## 2024-05-10 ENCOUNTER — APPOINTMENT (OUTPATIENT)
Dept: OPHTHALMOLOGY | Facility: AMBULATORY SURGERY CENTER | Age: 79
End: 2024-05-10

## 2024-05-10 ENCOUNTER — APPOINTMENT (OUTPATIENT)
Dept: OPHTHALMOLOGY | Facility: CLINIC | Age: 79
End: 2024-05-10
Payer: MEDICARE

## 2024-05-10 VITALS
RESPIRATION RATE: 16 BRPM | TEMPERATURE: 98 F | WEIGHT: 106.48 LBS | OXYGEN SATURATION: 98 % | DIASTOLIC BLOOD PRESSURE: 70 MMHG | HEIGHT: 67 IN | HEART RATE: 92 BPM | SYSTOLIC BLOOD PRESSURE: 169 MMHG

## 2024-05-10 VITALS
RESPIRATION RATE: 16 BRPM | SYSTOLIC BLOOD PRESSURE: 137 MMHG | TEMPERATURE: 97 F | OXYGEN SATURATION: 98 % | HEART RATE: 73 BPM | DIASTOLIC BLOOD PRESSURE: 52 MMHG

## 2024-05-10 DIAGNOSIS — Z90.49 ACQUIRED ABSENCE OF OTHER SPECIFIED PARTS OF DIGESTIVE TRACT: Chronic | ICD-10-CM

## 2024-05-10 DIAGNOSIS — Z41.9 ENCOUNTER FOR PROCEDURE FOR PURPOSES OTHER THAN REMEDYING HEALTH STATE, UNSPECIFIED: Chronic | ICD-10-CM

## 2024-05-10 DIAGNOSIS — Z90.710 ACQUIRED ABSENCE OF BOTH CERVIX AND UTERUS: Chronic | ICD-10-CM

## 2024-05-10 PROBLEM — I10 ESSENTIAL (PRIMARY) HYPERTENSION: Chronic | Status: ACTIVE | Noted: 2024-05-09

## 2024-05-10 PROBLEM — E78.5 HYPERLIPIDEMIA, UNSPECIFIED: Chronic | Status: ACTIVE | Noted: 2024-05-09

## 2024-05-10 PROCEDURE — 6698F: CPT | Mod: LT

## 2024-05-10 PROCEDURE — 99024 POSTOP FOLLOW-UP VISIT: CPT

## 2024-05-10 PROCEDURE — 66984 XCAPSL CTRC RMVL W/O ECP: CPT | Mod: LT

## 2024-05-10 DEVICE — LENS IOL TECNIS EYHANCE DIB00 18.0D
Type: IMPLANTABLE DEVICE | Site: LEFT | Status: NON-FUNCTIONAL
Removed: 2024-05-10

## 2024-05-10 RX ORDER — SODIUM CHLORIDE 9 MG/ML
500 INJECTION, SOLUTION INTRAVENOUS
Refills: 0 | Status: DISCONTINUED | OUTPATIENT
Start: 2024-05-10 | End: 2024-05-10

## 2024-05-10 RX ORDER — TROPICAMIDE 1 %
1 DROPS OPHTHALMIC (EYE)
Refills: 0 | Status: COMPLETED | OUTPATIENT
Start: 2024-05-10 | End: 2024-05-10

## 2024-05-10 RX ORDER — OFLOXACIN 0.3 %
1 DROPS OPHTHALMIC (EYE)
Refills: 0 | Status: COMPLETED | OUTPATIENT
Start: 2024-05-10 | End: 2024-05-10

## 2024-05-10 RX ORDER — PHENYLEPHRINE HCL 2.5 %
1 DROPS OPHTHALMIC (EYE)
Refills: 0 | Status: COMPLETED | OUTPATIENT
Start: 2024-05-10 | End: 2024-05-10

## 2024-05-10 RX ORDER — ACETAMINOPHEN 500 MG
650 TABLET ORAL ONCE
Refills: 0 | Status: DISCONTINUED | OUTPATIENT
Start: 2024-05-10 | End: 2024-05-10

## 2024-05-10 RX ADMIN — Medication 1 DROP(S): at 09:00

## 2024-05-10 RX ADMIN — Medication 1 DROP(S): at 08:50

## 2024-05-10 RX ADMIN — Medication 1 DROP(S): at 08:55

## 2024-05-10 NOTE — OPERATIVE REPORT - OPERATIVE RPOSRT DETAILS
PATIENT: YUSUF QUINTERO	    OPERATING SURGEON:  Suyeon Yu MD    DATE OF SURGERY:  [   5/10/24 ]	    ANESTHESIA:  MAC/TOPICAL	    ESTIMATED BLOOD LOSS: < 1mL	    COMPLICATIONS:  [  none   ]		    PREOPERATIVE DIAGNOSIS:  Astigmatism, Cataract,  [  LEFT   ] eye    POSTOPERATIVE DIAGNOSIS: Astigmatism, Cataract, [ LEFT  ] eye    OPERATIVE PROCEDURE:  1. Femtosecond laser assisted laser surgery        [ LEFT  ] eye.  2. Phacoemulsification with insertion of posterior chamber intraocular lens   [  LEFT   ] eye    LENS IMPLANT: [  DIB00 18D   ]    PROCEDURE:	The patient was brought to the laser room where certain aspects of the procedure were performed with the femtosecond laser.     The patient was then brought into the operating room and placed supine on the operating room table. After uneventful induction of neuroleptic anesthesia, lidocaine gel was instilled into the operative eye achieving sufficient anesthesia.  The patient was then prepped and draped in the usual sterile fashion.  A wire lid speculum was then inserted into the operative eye giving a wide palpebral fissure.      	A rosalina knife was used to perform paracenteses through clear cornea at the 6 o’clock limbal position.  The anterior chamber was irrigated with lidocaine 1% nonpreserved followed by epinephrine 0.1% nonpreserved.  Viscoelastic was then used to maintain the anterior chamber.  A keratome was used to create a clear corneal incision just inside the temporal limbus.  Capsulorhexis forceps were used to complete the capsulorhexis. Balanced salt solution was used to hydrodissect the nucleus.  The ImmuRx phacoemulsification unit was then used to completely phacoemulsify the nucleus, following which an aspirating handpiece was used to aspirate all cortical remnants from the capsular bag.  Viscoelastic was  used to reform the anterior chamber and capsular bag.      	A new foldable posterior chamber intraocular lens was brought into the surgical field.  It was folded and directly injected into the capsular bag following which it was centered and secure.  All viscoelastic was then aspirated from the anterior segment using an aspirating handpiece.  All wounds were checked for leaks and there were none.  Tobradex ointment was instilled into the eye.  The lid speculum was removed from the eye and a shield placed over the eye.      	The patient tolerated the procedure well and went to the recovery area in good condition.

## 2024-05-10 NOTE — ASU DISCHARGE PLAN (ADULT/PEDIATRIC) - NS MD DC FALL RISK RISK
For information on Fall & Injury Prevention, visit: [email or web address] OR  [email or web address] OR  https://www.cdc.gov/steadi/patient.html

## 2024-05-15 ENCOUNTER — NON-APPOINTMENT (OUTPATIENT)
Age: 79
End: 2024-05-15

## 2024-05-15 ENCOUNTER — APPOINTMENT (OUTPATIENT)
Dept: OPHTHALMOLOGY | Facility: CLINIC | Age: 79
End: 2024-05-15
Payer: MEDICARE

## 2024-05-15 ENCOUNTER — TRANSCRIPTION ENCOUNTER (OUTPATIENT)
Age: 79
End: 2024-05-15

## 2024-05-15 PROCEDURE — 99024 POSTOP FOLLOW-UP VISIT: CPT

## 2024-06-05 ENCOUNTER — APPOINTMENT (OUTPATIENT)
Dept: OPHTHALMOLOGY | Facility: CLINIC | Age: 79
End: 2024-06-05
Payer: MEDICARE

## 2024-06-05 ENCOUNTER — NON-APPOINTMENT (OUTPATIENT)
Age: 79
End: 2024-06-05

## 2024-06-05 PROCEDURE — 99024 POSTOP FOLLOW-UP VISIT: CPT

## 2024-06-13 NOTE — ASU PATIENT PROFILE, ADULT - NS PREOP UNDERSTANDS INFO
Informed pt about surgery time 1230, last meal 0000 no dairy, and last drink 0930 of water or apple juice 3 hrs before surgery. Bring photo ID and isurance card to the first floor. Must have an escort that is 18+. Leave jewelry, piercings, and contacts at home./yes

## 2024-06-14 ENCOUNTER — NON-APPOINTMENT (OUTPATIENT)
Age: 79
End: 2024-06-14

## 2024-06-14 ENCOUNTER — APPOINTMENT (OUTPATIENT)
Dept: OPHTHALMOLOGY | Facility: CLINIC | Age: 79
End: 2024-06-14
Payer: MEDICARE

## 2024-06-14 ENCOUNTER — OUTPATIENT (OUTPATIENT)
Dept: OUTPATIENT SERVICES | Facility: HOSPITAL | Age: 79
LOS: 1 days | Discharge: HOME | End: 2024-06-14
Payer: MEDICARE

## 2024-06-14 ENCOUNTER — APPOINTMENT (OUTPATIENT)
Dept: OPHTHALMOLOGY | Facility: AMBULATORY SURGERY CENTER | Age: 79
End: 2024-06-14

## 2024-06-14 VITALS
SYSTOLIC BLOOD PRESSURE: 156 MMHG | DIASTOLIC BLOOD PRESSURE: 68 MMHG | HEART RATE: 88 BPM | OXYGEN SATURATION: 98 % | HEIGHT: 67 IN | RESPIRATION RATE: 16 BRPM | WEIGHT: 109.13 LBS | TEMPERATURE: 98 F

## 2024-06-14 VITALS
TEMPERATURE: 97 F | OXYGEN SATURATION: 100 % | RESPIRATION RATE: 16 BRPM | DIASTOLIC BLOOD PRESSURE: 58 MMHG | SYSTOLIC BLOOD PRESSURE: 136 MMHG | HEART RATE: 83 BPM

## 2024-06-14 DIAGNOSIS — Z90.49 ACQUIRED ABSENCE OF OTHER SPECIFIED PARTS OF DIGESTIVE TRACT: Chronic | ICD-10-CM

## 2024-06-14 DIAGNOSIS — Z41.9 ENCOUNTER FOR PROCEDURE FOR PURPOSES OTHER THAN REMEDYING HEALTH STATE, UNSPECIFIED: Chronic | ICD-10-CM

## 2024-06-14 DIAGNOSIS — Z90.710 ACQUIRED ABSENCE OF BOTH CERVIX AND UTERUS: Chronic | ICD-10-CM

## 2024-06-14 PROCEDURE — 66984 XCAPSL CTRC RMVL W/O ECP: CPT | Mod: RT,79

## 2024-06-14 PROCEDURE — 99024 POSTOP FOLLOW-UP VISIT: CPT

## 2024-06-14 PROCEDURE — 6698F: CPT | Mod: RT,79

## 2024-06-14 DEVICE — LENS IOL TECNIS EYHANCE DIB00 19.0D
Type: IMPLANTABLE DEVICE | Site: RIGHT | Status: NON-FUNCTIONAL
Removed: 2024-06-14

## 2024-06-14 RX ORDER — PHENYLEPHRINE HCL 2.5 %
1 DROPS OPHTHALMIC (EYE)
Refills: 0 | Status: COMPLETED | OUTPATIENT
Start: 2024-06-14 | End: 2024-06-14

## 2024-06-14 RX ORDER — ATORVASTATIN CALCIUM 80 MG/1
1 TABLET, FILM COATED ORAL
Refills: 0 | DISCHARGE

## 2024-06-14 RX ORDER — PREGABALIN 225 MG/1
0 CAPSULE ORAL
Refills: 0 | DISCHARGE

## 2024-06-14 RX ORDER — TROPICAMIDE 1 %
1 DROPS OPHTHALMIC (EYE)
Refills: 0 | Status: COMPLETED | OUTPATIENT
Start: 2024-06-14 | End: 2024-06-14

## 2024-06-14 RX ORDER — CHOLECALCIFEROL (VITAMIN D3) 125 MCG
1 CAPSULE ORAL
Refills: 0 | DISCHARGE

## 2024-06-14 RX ORDER — LISINOPRIL 2.5 MG/1
1 TABLET ORAL
Refills: 0 | DISCHARGE

## 2024-06-14 RX ORDER — OFLOXACIN 0.3 %
1 DROPS OPHTHALMIC (EYE)
Refills: 0 | Status: COMPLETED | OUTPATIENT
Start: 2024-06-14 | End: 2024-06-14

## 2024-06-14 RX ADMIN — Medication 1 DROP(S): at 12:01

## 2024-06-14 RX ADMIN — Medication 1 DROP(S): at 11:53

## 2024-06-14 RX ADMIN — Medication 1 DROP(S): at 11:52

## 2024-06-14 RX ADMIN — Medication 1 DROP(S): at 12:10

## 2024-06-14 RX ADMIN — Medication 1 DROP(S): at 11:51

## 2024-06-14 NOTE — ASU PREOP CHECKLIST - ANTIBIOTIC
n/a Chonodrocutaneous Helical Advancement Flap Text: The defect edges were debeveled with a #15 scalpel blade.  Given the location of the defect and the proximity to free margins a chondrocutaneous helical advancement flap was deemed most appropriate.  Using a sterile surgical marker, the appropriate advancement flap was drawn incorporating the defect and placing the expected incisions within the relaxed skin tension lines where possible.    The area thus outlined was incised deep to adipose tissue with a #15 scalpel blade.  The skin margins were undermined to an appropriate distance in all directions utilizing iris scissors.

## 2024-06-14 NOTE — OPERATIVE REPORT - OPERATIVE RPOSRT DETAILS
PATIENT: YUSUF QUINTERO	    OPERATING SURGEON:  Suyeon Yu MD    DATE OF SURGERY:  [   6/14/24  ]	    ANESTHESIA:  MAC/TOPICAL	    ESTIMATED BLOOD LOSS: < 1mL	    COMPLICATIONS:  [  none   ]		    PREOPERATIVE DIAGNOSIS:  Astigmatism, Cataract,  [  RIGHT ] eye    POSTOPERATIVE DIAGNOSIS: Astigmatism, Cataract, [ RIGHT ] eye    OPERATIVE PROCEDURE:  1. Femtosecond laser assisted laser surgery        [ RIGHT ] eye.  2. Phacoemulsification with insertion of posterior chamber intraocular lens   [  RIGHT   ] eye    LENS IMPLANT: [  DIB00 +19.0   ]    PROCEDURE:	The patient was brought to the laser room where certain aspects of the procedure were performed with the femtosecond laser.     The patient was then brought into the operating room and placed supine on the operating room table. After uneventful induction of neuroleptic anesthesia, lidocaine gel was instilled into the operative eye achieving sufficient anesthesia.  The patient was then prepped and draped in the usual sterile fashion.  A wire lid speculum was then inserted into the operative eye giving a wide palpebral fissure.      	A rosalina knife was used to perform paracenteses through clear cornea at the 12 o’clock limbal position.  The anterior chamber was irrigated with lidocaine 1% nonpreserved followed by epinephrine 0.1% nonpreserved.  Viscoelastic was then used to maintain the anterior chamber.  A keratome was used to create a clear corneal incision just inside the temporal limbus.  Capsulorhexis forceps were used to complete the capsulorhexis. Balanced salt solution was used to hydrodissect the nucleus.  The [employer]han phacoemulsification unit was then used to completely phacoemulsify the nucleus, following which an aspirating handpiece was used to aspirate all cortical remnants from the capsular bag.  Viscoelastic was  used to reform the anterior chamber and capsular bag.      	A new foldable posterior chamber intraocular lens was brought into the surgical field.  It was folded and directly injected into the capsular bag following which it was centered and secure.  All viscoelastic was then aspirated from the anterior segment using an aspirating handpiece.  All wounds were checked for leaks and there were none.  Tobradex ointment was instilled into the eye.  The lid speculum was removed from the eye and a shield placed over the eye.      	The patient tolerated the procedure well and went to the recovery area in good condition.

## 2024-06-19 ENCOUNTER — NON-APPOINTMENT (OUTPATIENT)
Age: 79
End: 2024-06-19

## 2024-06-19 ENCOUNTER — APPOINTMENT (OUTPATIENT)
Dept: OPHTHALMOLOGY | Facility: CLINIC | Age: 79
End: 2024-06-19
Payer: MEDICARE

## 2024-06-19 PROCEDURE — 99024 POSTOP FOLLOW-UP VISIT: CPT

## 2024-07-03 ENCOUNTER — NON-APPOINTMENT (OUTPATIENT)
Age: 79
End: 2024-07-03

## 2024-07-03 ENCOUNTER — APPOINTMENT (OUTPATIENT)
Dept: OPHTHALMOLOGY | Facility: CLINIC | Age: 79
End: 2024-07-03
Payer: MEDICARE

## 2024-07-03 PROCEDURE — 99024 POSTOP FOLLOW-UP VISIT: CPT

## 2024-07-10 ENCOUNTER — APPOINTMENT (OUTPATIENT)
Dept: OPHTHALMOLOGY | Facility: CLINIC | Age: 79
End: 2024-07-10

## 2024-07-28 NOTE — ASU PATIENT PROFILE, ADULT - ABILITY TO HEAR (WITH HEARING AID OR HEARING APPLIANCE IF NORMALLY USED):
Pt presents from home with c.o N/V/D and fever.    Adequate: hears normal conversation without difficulty

## 2024-10-15 ENCOUNTER — NON-APPOINTMENT (OUTPATIENT)
Age: 79
End: 2024-10-15

## 2024-10-15 ENCOUNTER — APPOINTMENT (OUTPATIENT)
Dept: OPHTHALMOLOGY | Facility: CLINIC | Age: 79
End: 2024-10-15
Payer: MEDICARE

## 2024-10-15 PROCEDURE — 92133 CPTRZD OPH DX IMG PST SGM ON: CPT

## 2024-10-15 PROCEDURE — 92012 INTRM OPH EXAM EST PATIENT: CPT

## 2025-05-14 ENCOUNTER — APPOINTMENT (OUTPATIENT)
Dept: OPHTHALMOLOGY | Facility: CLINIC | Age: 80
End: 2025-05-14

## 2025-06-03 ENCOUNTER — NON-APPOINTMENT (OUTPATIENT)
Age: 80
End: 2025-06-03

## 2025-06-03 ENCOUNTER — APPOINTMENT (OUTPATIENT)
Dept: OPHTHALMOLOGY | Facility: CLINIC | Age: 80
End: 2025-06-03
Payer: MEDICARE

## 2025-06-03 PROCEDURE — 92133 CPTRZD OPH DX IMG PST SGM ON: CPT

## 2025-06-03 PROCEDURE — 99024 POSTOP FOLLOW-UP VISIT: CPT

## 2025-06-03 PROCEDURE — 92083 EXTENDED VISUAL FIELD XM: CPT

## 2025-07-01 ENCOUNTER — APPOINTMENT (OUTPATIENT)
Dept: OPHTHALMOLOGY | Facility: CLINIC | Age: 80
End: 2025-07-01
Payer: MEDICARE

## 2025-07-01 ENCOUNTER — NON-APPOINTMENT (OUTPATIENT)
Age: 80
End: 2025-07-01

## 2025-07-01 PROCEDURE — 92133 CPTRZD OPH DX IMG PST SGM ON: CPT

## 2025-07-01 PROCEDURE — 92014 COMPRE OPH EXAM EST PT 1/>: CPT

## (undated) DEVICE — PACK CENTURION 2.4MM

## (undated) DEVICE — SUT NYLON 10-0 12" CU-5

## (undated) DEVICE — PACK ANTERIOR SEGMENT

## (undated) DEVICE — SPECIMEN CONTAINER 4OZ

## (undated) DEVICE — SOL IRR BAL SALT 500ML

## (undated) DEVICE — Device

## (undated) DEVICE — CANNULA ANT CHMBR 27GX22MM

## (undated) DEVICE — CANNULA IRR ANT CHAMBER 30G

## (undated) DEVICE — DRSG CURITY GAUZE SPONGE 4 X 4" 12-PLY

## (undated) DEVICE — DRAPE MICROSCOPE KNOB COVER SMALL (2 PCS)

## (undated) DEVICE — GLV 8 PROTEXIS (WHITE)